# Patient Record
Sex: FEMALE | Race: WHITE | NOT HISPANIC OR LATINO
[De-identification: names, ages, dates, MRNs, and addresses within clinical notes are randomized per-mention and may not be internally consistent; named-entity substitution may affect disease eponyms.]

---

## 2019-06-12 ENCOUNTER — TRANSCRIPTION ENCOUNTER (OUTPATIENT)
Age: 34
End: 2019-06-12

## 2019-06-12 ENCOUNTER — OUTPATIENT (OUTPATIENT)
Dept: OUTPATIENT SERVICES | Facility: HOSPITAL | Age: 34
LOS: 1 days | End: 2019-06-12
Payer: COMMERCIAL

## 2019-06-12 VITALS
HEIGHT: 64 IN | TEMPERATURE: 98 F | OXYGEN SATURATION: 98 % | DIASTOLIC BLOOD PRESSURE: 78 MMHG | HEART RATE: 70 BPM | WEIGHT: 210.1 LBS | SYSTOLIC BLOOD PRESSURE: 101 MMHG | RESPIRATION RATE: 15 BRPM

## 2019-06-12 DIAGNOSIS — Z87.39 PERSONAL HISTORY OF OTHER DISEASES OF THE MUSCULOSKELETAL SYSTEM AND CONNECTIVE TISSUE: Chronic | ICD-10-CM

## 2019-06-12 DIAGNOSIS — O02.1 MISSED ABORTION: ICD-10-CM

## 2019-06-12 DIAGNOSIS — Z98.890 OTHER SPECIFIED POSTPROCEDURAL STATES: Chronic | ICD-10-CM

## 2019-06-12 LAB
BLD GP AB SCN SERPL QL: NEGATIVE — SIGNIFICANT CHANGE UP
RH IG SCN BLD-IMP: POSITIVE — SIGNIFICANT CHANGE UP

## 2019-06-12 PROCEDURE — G0463: CPT

## 2019-06-12 PROCEDURE — 86901 BLOOD TYPING SEROLOGIC RH(D): CPT

## 2019-06-12 PROCEDURE — 86850 RBC ANTIBODY SCREEN: CPT

## 2019-06-12 PROCEDURE — 85027 COMPLETE CBC AUTOMATED: CPT

## 2019-06-12 PROCEDURE — 86900 BLOOD TYPING SEROLOGIC ABO: CPT

## 2019-06-12 RX ORDER — SODIUM CHLORIDE 9 MG/ML
3 INJECTION INTRAMUSCULAR; INTRAVENOUS; SUBCUTANEOUS EVERY 8 HOURS
Refills: 0 | Status: DISCONTINUED | OUTPATIENT
Start: 2019-06-13 | End: 2019-06-28

## 2019-06-12 NOTE — H&P PST ADULT - NSANTHOSAYNRD_GEN_A_CORE
No. FISH screening performed.  STOP BANG Legend: 0-2 = LOW Risk; 3-4 = INTERMEDIATE Risk; 5-8 = HIGH Risk

## 2019-06-12 NOTE — H&P PST ADULT - ATTENDING COMMENTS
Pt seen and evaluated.  Here for Dilation and vacuum curettage for missed . Risks benefits and alternatives reviewed.  All questions answered.  Pt wishes to proceed.

## 2019-06-12 NOTE — H&P PST ADULT - HISTORY OF PRESENT ILLNESS
33 year old female  reports having no heart beat @ 9 weeks of pregnancy, scheduled for suction tomorrow 19.

## 2019-06-12 NOTE — H&P PST ADULT - NSICDXPASTMEDICALHX_GEN_ALL_CORE_FT
PAST MEDICAL HISTORY:  Broviac site infection, sequela infection stomach to groin, - for right great big toe osteomyelitis    Missed  9 weeks of pregnancy

## 2019-06-12 NOTE — H&P PST ADULT - NSICDXPASTSURGICALHX_GEN_ALL_CORE_FT
PAST SURGICAL HISTORY:  H/O osteomyelitis right big toe  infection stomach to groin, 1998-99 for right great big toe osteomyelitis    History of augmentation of left breast 2005  & bilateral revision 2010

## 2019-06-13 ENCOUNTER — RESULT REVIEW (OUTPATIENT)
Age: 34
End: 2019-06-13

## 2019-06-13 ENCOUNTER — OUTPATIENT (OUTPATIENT)
Dept: OUTPATIENT SERVICES | Facility: HOSPITAL | Age: 34
LOS: 1 days | End: 2019-06-13
Payer: COMMERCIAL

## 2019-06-13 VITALS
DIASTOLIC BLOOD PRESSURE: 67 MMHG | SYSTOLIC BLOOD PRESSURE: 108 MMHG | RESPIRATION RATE: 18 BRPM | OXYGEN SATURATION: 100 % | WEIGHT: 210.1 LBS | HEIGHT: 64 IN | HEART RATE: 67 BPM | TEMPERATURE: 97 F

## 2019-06-13 VITALS
HEART RATE: 63 BPM | DIASTOLIC BLOOD PRESSURE: 62 MMHG | TEMPERATURE: 99 F | OXYGEN SATURATION: 100 % | RESPIRATION RATE: 18 BRPM | SYSTOLIC BLOOD PRESSURE: 110 MMHG

## 2019-06-13 DIAGNOSIS — Z87.39 PERSONAL HISTORY OF OTHER DISEASES OF THE MUSCULOSKELETAL SYSTEM AND CONNECTIVE TISSUE: Chronic | ICD-10-CM

## 2019-06-13 DIAGNOSIS — O02.1 MISSED ABORTION: ICD-10-CM

## 2019-06-13 DIAGNOSIS — Z98.890 OTHER SPECIFIED POSTPROCEDURAL STATES: Chronic | ICD-10-CM

## 2019-06-13 LAB
HCT VFR BLD CALC: 37 % — SIGNIFICANT CHANGE UP (ref 34.5–45)
HGB BLD-MCNC: 12.7 G/DL — SIGNIFICANT CHANGE UP (ref 11.5–15.5)
MCHC RBC-ENTMCNC: 31.3 PG — SIGNIFICANT CHANGE UP (ref 27–34)
MCHC RBC-ENTMCNC: 34.3 GM/DL — SIGNIFICANT CHANGE UP (ref 32–36)
MCV RBC AUTO: 91.1 FL — SIGNIFICANT CHANGE UP (ref 80–100)
PLATELET # BLD AUTO: 270 K/UL — SIGNIFICANT CHANGE UP (ref 150–400)
RBC # BLD: 4.06 M/UL — SIGNIFICANT CHANGE UP (ref 3.8–5.2)
RBC # FLD: 11.7 % — SIGNIFICANT CHANGE UP (ref 10.3–14.5)
WBC # BLD: 9.2 K/UL — SIGNIFICANT CHANGE UP (ref 3.8–10.5)
WBC # FLD AUTO: 9.2 K/UL — SIGNIFICANT CHANGE UP (ref 3.8–10.5)

## 2019-06-13 PROCEDURE — 59820 CARE OF MISCARRIAGE: CPT

## 2019-06-13 PROCEDURE — 88305 TISSUE EXAM BY PATHOLOGIST: CPT

## 2019-06-13 PROCEDURE — 88305 TISSUE EXAM BY PATHOLOGIST: CPT | Mod: 26

## 2019-06-13 RX ORDER — CELECOXIB 200 MG/1
200 CAPSULE ORAL ONCE
Refills: 0 | Status: COMPLETED | OUTPATIENT
Start: 2019-06-13 | End: 2019-06-13

## 2019-06-13 RX ORDER — ACETAMINOPHEN 500 MG
1000 TABLET ORAL ONCE
Refills: 0 | Status: COMPLETED | OUTPATIENT
Start: 2019-06-13 | End: 2019-06-13

## 2019-06-13 RX ORDER — SODIUM CHLORIDE 9 MG/ML
1000 INJECTION, SOLUTION INTRAVENOUS
Refills: 0 | Status: DISCONTINUED | OUTPATIENT
Start: 2019-06-13 | End: 2019-06-28

## 2019-06-13 RX ADMIN — Medication 1000 MILLIGRAM(S): at 10:33

## 2019-06-13 RX ADMIN — CELECOXIB 200 MILLIGRAM(S): 200 CAPSULE ORAL at 10:33

## 2019-06-13 NOTE — ASU PATIENT PROFILE, ADULT - PMH
Broviac site infection, sequela  infection stomach to groin,  for right great big toe osteomyelitis  Missed   9 weeks of pregnancy

## 2019-06-13 NOTE — ASU DISCHARGE PLAN (ADULT/PEDIATRIC) - NURSING INSTRUCTIONS
call if not void in 8 hours , for excessive bleeding, pain ,swelling or fever greater than 101. Pelvic rest for 2 weeks

## 2019-06-13 NOTE — BRIEF OPERATIVE NOTE - NSICDXBRIEFPROCEDURE_GEN_ALL_CORE_FT
PROCEDURES:  Dilation and curettage, uterus, using suction, for missed first trimester  2019 12:27:08  Sneha Zuñiga

## 2019-06-13 NOTE — ASU PATIENT PROFILE, ADULT - PSH
H/O osteomyelitis  right big toe  infection stomach to groin, 1998-99 for right great big toe osteomyelitis  History of augmentation of left breast  2005  & bilateral revision 2010

## 2019-06-13 NOTE — ASU DISCHARGE PLAN (ADULT/PEDIATRIC) - CARE PROVIDER_API CALL
Sneha Zuñiga (MD)  Obstetrics  Gynecology  [address] [place] [id number]  Phone: [phone]  Fax: [phone]  Follow Up Time:

## 2020-02-03 ENCOUNTER — RESULT REVIEW (OUTPATIENT)
Age: 35
End: 2020-02-03

## 2020-04-18 PROBLEM — O02.1 MISSED ABORTION: Chronic | Status: ACTIVE | Noted: 2019-06-12

## 2020-04-18 PROBLEM — T80.219S: Chronic | Status: ACTIVE | Noted: 2019-06-12

## 2020-04-27 ENCOUNTER — APPOINTMENT (OUTPATIENT)
Dept: ANTEPARTUM | Facility: CLINIC | Age: 35
End: 2020-04-27
Payer: COMMERCIAL

## 2020-04-27 ENCOUNTER — ASOB RESULT (OUTPATIENT)
Age: 35
End: 2020-04-27

## 2020-04-27 PROCEDURE — 76811 OB US DETAILED SNGL FETUS: CPT

## 2020-08-27 ENCOUNTER — APPOINTMENT (OUTPATIENT)
Dept: DISASTER EMERGENCY | Facility: CLINIC | Age: 35
End: 2020-08-27

## 2020-08-28 LAB — SARS-COV-2 N GENE NPH QL NAA+PROBE: NOT DETECTED

## 2020-08-29 ENCOUNTER — TRANSCRIPTION ENCOUNTER (OUTPATIENT)
Age: 35
End: 2020-08-29

## 2020-08-30 ENCOUNTER — OUTPATIENT (OUTPATIENT)
Dept: OUTPATIENT SERVICES | Facility: HOSPITAL | Age: 35
LOS: 1 days | End: 2020-08-30
Payer: COMMERCIAL

## 2020-08-30 ENCOUNTER — INPATIENT (INPATIENT)
Facility: HOSPITAL | Age: 35
LOS: 1 days | Discharge: HOME | End: 2020-09-01
Attending: OBSTETRICS & GYNECOLOGY | Admitting: OBSTETRICS & GYNECOLOGY
Payer: COMMERCIAL

## 2020-08-30 VITALS — HEIGHT: 65 IN | WEIGHT: 257.94 LBS

## 2020-08-30 DIAGNOSIS — Z87.39 PERSONAL HISTORY OF OTHER DISEASES OF THE MUSCULOSKELETAL SYSTEM AND CONNECTIVE TISSUE: Chronic | ICD-10-CM

## 2020-08-30 DIAGNOSIS — O26.899 OTHER SPECIFIED PREGNANCY RELATED CONDITIONS, UNSPECIFIED TRIMESTER: ICD-10-CM

## 2020-08-30 DIAGNOSIS — Z98.890 OTHER SPECIFIED POSTPROCEDURAL STATES: Chronic | ICD-10-CM

## 2020-08-30 DIAGNOSIS — Z3A.00 WEEKS OF GESTATION OF PREGNANCY NOT SPECIFIED: ICD-10-CM

## 2020-08-30 DIAGNOSIS — Z33.1 PREGNANT STATE, INCIDENTAL: ICD-10-CM

## 2020-08-30 LAB
BASOPHILS # BLD AUTO: 0.07 K/UL — SIGNIFICANT CHANGE UP (ref 0–0.2)
BASOPHILS NFR BLD AUTO: 0.6 % — SIGNIFICANT CHANGE UP (ref 0–2)
BLD GP AB SCN SERPL QL: NEGATIVE — SIGNIFICANT CHANGE UP
EOSINOPHIL # BLD AUTO: 0.09 K/UL — SIGNIFICANT CHANGE UP (ref 0–0.5)
EOSINOPHIL NFR BLD AUTO: 0.8 % — SIGNIFICANT CHANGE UP (ref 0–6)
HCT VFR BLD CALC: 33.6 % — LOW (ref 34.5–45)
HGB BLD-MCNC: 11.6 G/DL — SIGNIFICANT CHANGE UP (ref 11.5–15.5)
IMM GRANULOCYTES NFR BLD AUTO: 0.8 % — SIGNIFICANT CHANGE UP (ref 0–1.5)
LYMPHOCYTES # BLD AUTO: 17.3 % — SIGNIFICANT CHANGE UP (ref 13–44)
LYMPHOCYTES # BLD AUTO: 2 K/UL — SIGNIFICANT CHANGE UP (ref 1–3.3)
MCHC RBC-ENTMCNC: 31.9 PG — SIGNIFICANT CHANGE UP (ref 27–34)
MCHC RBC-ENTMCNC: 34.5 GM/DL — SIGNIFICANT CHANGE UP (ref 32–36)
MCV RBC AUTO: 92.3 FL — SIGNIFICANT CHANGE UP (ref 80–100)
MONOCYTES # BLD AUTO: 0.86 K/UL — SIGNIFICANT CHANGE UP (ref 0–0.9)
MONOCYTES NFR BLD AUTO: 7.4 % — SIGNIFICANT CHANGE UP (ref 2–14)
NEUTROPHILS # BLD AUTO: 8.47 K/UL — HIGH (ref 1.8–7.4)
NEUTROPHILS NFR BLD AUTO: 73.1 % — SIGNIFICANT CHANGE UP (ref 43–77)
NRBC # BLD: 0 /100 WBCS — SIGNIFICANT CHANGE UP (ref 0–0)
PLATELET # BLD AUTO: 186 K/UL — SIGNIFICANT CHANGE UP (ref 150–400)
RBC # BLD: 3.64 M/UL — LOW (ref 3.8–5.2)
RBC # FLD: 12.9 % — SIGNIFICANT CHANGE UP (ref 10.3–14.5)
RH IG SCN BLD-IMP: POSITIVE — SIGNIFICANT CHANGE UP
SARS-COV-2 IGG SERPL QL IA: NEGATIVE — SIGNIFICANT CHANGE UP
SARS-COV-2 IGM SERPL IA-ACNC: 0.08 INDEX — SIGNIFICANT CHANGE UP
T PALLIDUM AB TITR SER: NEGATIVE — SIGNIFICANT CHANGE UP
WBC # BLD: 11.58 K/UL — HIGH (ref 3.8–10.5)
WBC # FLD AUTO: 11.58 K/UL — HIGH (ref 3.8–10.5)

## 2020-08-30 PROCEDURE — G0463: CPT

## 2020-08-30 RX ORDER — CITRIC ACID/SODIUM CITRATE 300-500 MG
15 SOLUTION, ORAL ORAL ONCE
Refills: 0 | Status: COMPLETED | OUTPATIENT
Start: 2020-08-30 | End: 2020-08-30

## 2020-08-30 RX ORDER — OXYCODONE HYDROCHLORIDE 5 MG/1
5 TABLET ORAL
Refills: 0 | Status: DISCONTINUED | OUTPATIENT
Start: 2020-08-30 | End: 2020-08-31

## 2020-08-30 RX ORDER — SODIUM CHLORIDE 9 MG/ML
1000 INJECTION, SOLUTION INTRAVENOUS
Refills: 0 | Status: DISCONTINUED | OUTPATIENT
Start: 2020-08-30 | End: 2020-08-30

## 2020-08-30 RX ORDER — SIMETHICONE 80 MG/1
80 TABLET, CHEWABLE ORAL EVERY 4 HOURS
Refills: 0 | Status: DISCONTINUED | OUTPATIENT
Start: 2020-08-30 | End: 2020-09-01

## 2020-08-30 RX ORDER — SODIUM CHLORIDE 9 MG/ML
1000 INJECTION, SOLUTION INTRAVENOUS
Refills: 0 | Status: DISCONTINUED | OUTPATIENT
Start: 2020-08-30 | End: 2020-09-01

## 2020-08-30 RX ORDER — IBUPROFEN 200 MG
600 TABLET ORAL EVERY 6 HOURS
Refills: 0 | Status: COMPLETED | OUTPATIENT
Start: 2020-08-30 | End: 2021-07-29

## 2020-08-30 RX ORDER — TETANUS TOXOID, REDUCED DIPHTHERIA TOXOID AND ACELLULAR PERTUSSIS VACCINE, ADSORBED 5; 2.5; 8; 8; 2.5 [IU]/.5ML; [IU]/.5ML; UG/.5ML; UG/.5ML; UG/.5ML
0.5 SUSPENSION INTRAMUSCULAR ONCE
Refills: 0 | Status: DISCONTINUED | OUTPATIENT
Start: 2020-08-30 | End: 2020-09-01

## 2020-08-30 RX ORDER — NALOXONE HYDROCHLORIDE 4 MG/.1ML
0.1 SPRAY NASAL
Refills: 0 | Status: DISCONTINUED | OUTPATIENT
Start: 2020-08-30 | End: 2020-08-31

## 2020-08-30 RX ORDER — OXYCODONE HYDROCHLORIDE 5 MG/1
5 TABLET ORAL
Refills: 0 | Status: COMPLETED | OUTPATIENT
Start: 2020-08-30 | End: 2020-09-06

## 2020-08-30 RX ORDER — OXYCODONE HYDROCHLORIDE 5 MG/1
10 TABLET ORAL
Refills: 0 | Status: DISCONTINUED | OUTPATIENT
Start: 2020-08-30 | End: 2020-08-31

## 2020-08-30 RX ORDER — HEPARIN SODIUM 5000 [USP'U]/ML
5000 INJECTION INTRAVENOUS; SUBCUTANEOUS EVERY 12 HOURS
Refills: 0 | Status: DISCONTINUED | OUTPATIENT
Start: 2020-08-30 | End: 2020-09-01

## 2020-08-30 RX ORDER — MORPHINE SULFATE 50 MG/1
0.15 CAPSULE, EXTENDED RELEASE ORAL ONCE
Refills: 0 | Status: DISCONTINUED | OUTPATIENT
Start: 2020-08-30 | End: 2020-08-31

## 2020-08-30 RX ORDER — OXYTOCIN 10 UNIT/ML
333.33 VIAL (ML) INJECTION
Qty: 20 | Refills: 0 | Status: DISCONTINUED | OUTPATIENT
Start: 2020-08-30 | End: 2020-09-01

## 2020-08-30 RX ORDER — DIPHENHYDRAMINE HCL 50 MG
25 CAPSULE ORAL EVERY 6 HOURS
Refills: 0 | Status: DISCONTINUED | OUTPATIENT
Start: 2020-08-30 | End: 2020-09-01

## 2020-08-30 RX ORDER — ACETAMINOPHEN 500 MG
1000 TABLET ORAL ONCE
Refills: 0 | Status: COMPLETED | OUTPATIENT
Start: 2020-08-30 | End: 2020-08-30

## 2020-08-30 RX ORDER — OXYCODONE HYDROCHLORIDE 5 MG/1
5 TABLET ORAL ONCE
Refills: 0 | Status: DISCONTINUED | OUTPATIENT
Start: 2020-08-30 | End: 2020-09-01

## 2020-08-30 RX ORDER — KETOROLAC TROMETHAMINE 30 MG/ML
30 SYRINGE (ML) INJECTION EVERY 6 HOURS
Refills: 0 | Status: DISCONTINUED | OUTPATIENT
Start: 2020-08-30 | End: 2020-08-31

## 2020-08-30 RX ORDER — FAMOTIDINE 10 MG/ML
20 INJECTION INTRAVENOUS ONCE
Refills: 0 | Status: COMPLETED | OUTPATIENT
Start: 2020-08-30 | End: 2020-08-30

## 2020-08-30 RX ORDER — CITRIC ACID/SODIUM CITRATE 300-500 MG
30 SOLUTION, ORAL ORAL ONCE
Refills: 0 | Status: DISCONTINUED | OUTPATIENT
Start: 2020-08-30 | End: 2020-08-30

## 2020-08-30 RX ORDER — LANOLIN
1 OINTMENT (GRAM) TOPICAL EVERY 6 HOURS
Refills: 0 | Status: DISCONTINUED | OUTPATIENT
Start: 2020-08-30 | End: 2020-09-01

## 2020-08-30 RX ORDER — ONDANSETRON 8 MG/1
4 TABLET, FILM COATED ORAL EVERY 6 HOURS
Refills: 0 | Status: DISCONTINUED | OUTPATIENT
Start: 2020-08-30 | End: 2020-08-31

## 2020-08-30 RX ORDER — ACETAMINOPHEN 500 MG
975 TABLET ORAL
Refills: 0 | Status: DISCONTINUED | OUTPATIENT
Start: 2020-08-30 | End: 2020-09-01

## 2020-08-30 RX ORDER — SODIUM CHLORIDE 9 MG/ML
1000 INJECTION, SOLUTION INTRAVENOUS ONCE
Refills: 0 | Status: DISCONTINUED | OUTPATIENT
Start: 2020-08-30 | End: 2020-08-30

## 2020-08-30 RX ORDER — MAGNESIUM HYDROXIDE 400 MG/1
30 TABLET, CHEWABLE ORAL
Refills: 0 | Status: DISCONTINUED | OUTPATIENT
Start: 2020-08-30 | End: 2020-09-01

## 2020-08-30 RX ORDER — METOCLOPRAMIDE HCL 10 MG
10 TABLET ORAL ONCE
Refills: 0 | Status: DISCONTINUED | OUTPATIENT
Start: 2020-08-30 | End: 2020-08-30

## 2020-08-30 RX ORDER — DEXAMETHASONE 0.5 MG/5ML
4 ELIXIR ORAL EVERY 6 HOURS
Refills: 0 | Status: DISCONTINUED | OUTPATIENT
Start: 2020-08-30 | End: 2020-08-31

## 2020-08-30 RX ADMIN — FAMOTIDINE 20 MILLIGRAM(S): 10 INJECTION INTRAVENOUS at 07:45

## 2020-08-30 RX ADMIN — Medication 30 MILLIGRAM(S): at 16:15

## 2020-08-30 RX ADMIN — Medication 30 MILLIGRAM(S): at 21:44

## 2020-08-30 RX ADMIN — Medication 975 MILLIGRAM(S): at 18:45

## 2020-08-30 RX ADMIN — Medication 400 MILLIGRAM(S): at 12:25

## 2020-08-30 RX ADMIN — Medication 30 MILLIGRAM(S): at 15:43

## 2020-08-30 RX ADMIN — Medication 15 MILLILITER(S): at 07:45

## 2020-08-30 RX ADMIN — HEPARIN SODIUM 5000 UNIT(S): 5000 INJECTION INTRAVENOUS; SUBCUTANEOUS at 19:23

## 2020-08-30 RX ADMIN — Medication 30 MILLIGRAM(S): at 20:44

## 2020-08-30 RX ADMIN — Medication 975 MILLIGRAM(S): at 19:15

## 2020-08-30 NOTE — PRE-ANESTHESIA EVALUATION ADULT - NSATTENDATTESTRD_GEN_ALL_CORE
[Postmenopausal] : history of menopause having occurred
The patient has been re-examined and I agree with the above assessment or I updated with my findings.

## 2020-08-31 LAB
BASOPHILS # BLD AUTO: 0.07 K/UL — SIGNIFICANT CHANGE UP (ref 0–0.2)
BASOPHILS NFR BLD AUTO: 0.5 % — SIGNIFICANT CHANGE UP (ref 0–2)
EOSINOPHIL # BLD AUTO: 0.08 K/UL — SIGNIFICANT CHANGE UP (ref 0–0.5)
EOSINOPHIL NFR BLD AUTO: 0.6 % — SIGNIFICANT CHANGE UP (ref 0–6)
HCT VFR BLD CALC: 31.1 % — LOW (ref 34.5–45)
HGB BLD-MCNC: 10.8 G/DL — LOW (ref 11.5–15.5)
IMM GRANULOCYTES NFR BLD AUTO: 0.5 % — SIGNIFICANT CHANGE UP (ref 0–1.5)
LYMPHOCYTES # BLD AUTO: 1.34 K/UL — SIGNIFICANT CHANGE UP (ref 1–3.3)
LYMPHOCYTES # BLD AUTO: 10.1 % — LOW (ref 13–44)
MCHC RBC-ENTMCNC: 32 PG — SIGNIFICANT CHANGE UP (ref 27–34)
MCHC RBC-ENTMCNC: 34.7 GM/DL — SIGNIFICANT CHANGE UP (ref 32–36)
MCV RBC AUTO: 92.3 FL — SIGNIFICANT CHANGE UP (ref 80–100)
MONOCYTES # BLD AUTO: 0.99 K/UL — HIGH (ref 0–0.9)
MONOCYTES NFR BLD AUTO: 7.5 % — SIGNIFICANT CHANGE UP (ref 2–14)
NEUTROPHILS # BLD AUTO: 10.73 K/UL — HIGH (ref 1.8–7.4)
NEUTROPHILS NFR BLD AUTO: 80.8 % — HIGH (ref 43–77)
NRBC # BLD: 0 /100 WBCS — SIGNIFICANT CHANGE UP (ref 0–0)
PLATELET # BLD AUTO: 187 K/UL — SIGNIFICANT CHANGE UP (ref 150–400)
RBC # BLD: 3.37 M/UL — LOW (ref 3.8–5.2)
RBC # FLD: 12.9 % — SIGNIFICANT CHANGE UP (ref 10.3–14.5)
WBC # BLD: 13.28 K/UL — HIGH (ref 3.8–10.5)
WBC # FLD AUTO: 13.28 K/UL — HIGH (ref 3.8–10.5)

## 2020-08-31 RX ORDER — IBUPROFEN 200 MG
600 TABLET ORAL EVERY 6 HOURS
Refills: 0 | Status: DISCONTINUED | OUTPATIENT
Start: 2020-08-31 | End: 2020-09-01

## 2020-08-31 RX ORDER — OXYCODONE HYDROCHLORIDE 5 MG/1
5 TABLET ORAL
Refills: 0 | Status: DISCONTINUED | OUTPATIENT
Start: 2020-08-31 | End: 2020-09-01

## 2020-08-31 RX ADMIN — Medication 600 MILLIGRAM(S): at 08:56

## 2020-08-31 RX ADMIN — SIMETHICONE 80 MILLIGRAM(S): 80 TABLET, CHEWABLE ORAL at 15:09

## 2020-08-31 RX ADMIN — Medication 600 MILLIGRAM(S): at 21:42

## 2020-08-31 RX ADMIN — SIMETHICONE 80 MILLIGRAM(S): 80 TABLET, CHEWABLE ORAL at 08:56

## 2020-08-31 RX ADMIN — HEPARIN SODIUM 5000 UNIT(S): 5000 INJECTION INTRAVENOUS; SUBCUTANEOUS at 05:48

## 2020-08-31 RX ADMIN — HEPARIN SODIUM 5000 UNIT(S): 5000 INJECTION INTRAVENOUS; SUBCUTANEOUS at 18:39

## 2020-08-31 RX ADMIN — Medication 600 MILLIGRAM(S): at 15:08

## 2020-08-31 RX ADMIN — Medication 975 MILLIGRAM(S): at 18:38

## 2020-08-31 RX ADMIN — Medication 975 MILLIGRAM(S): at 05:48

## 2020-08-31 RX ADMIN — SIMETHICONE 80 MILLIGRAM(S): 80 TABLET, CHEWABLE ORAL at 18:39

## 2020-08-31 RX ADMIN — Medication 600 MILLIGRAM(S): at 09:30

## 2020-08-31 RX ADMIN — OXYCODONE HYDROCHLORIDE 5 MILLIGRAM(S): 5 TABLET ORAL at 17:30

## 2020-08-31 RX ADMIN — Medication 975 MILLIGRAM(S): at 01:00

## 2020-08-31 RX ADMIN — Medication 30 MILLIGRAM(S): at 03:00

## 2020-08-31 RX ADMIN — Medication 600 MILLIGRAM(S): at 21:12

## 2020-08-31 RX ADMIN — Medication 30 MILLIGRAM(S): at 03:30

## 2020-08-31 RX ADMIN — Medication 975 MILLIGRAM(S): at 00:14

## 2020-08-31 RX ADMIN — Medication 975 MILLIGRAM(S): at 06:28

## 2020-08-31 RX ADMIN — Medication 975 MILLIGRAM(S): at 12:45

## 2020-08-31 RX ADMIN — OXYCODONE HYDROCHLORIDE 5 MILLIGRAM(S): 5 TABLET ORAL at 16:58

## 2020-08-31 RX ADMIN — Medication 975 MILLIGRAM(S): at 12:13

## 2020-08-31 NOTE — PROGRESS NOTE ADULT - SUBJECTIVE AND OBJECTIVE BOX
Day 1 of Anesthesia Pain Management Service    SUBJECTIVE: Doing ok  Pain Scale Score:          [X] Refer to charted pain scores    THERAPY: s/p 150 mcg PF morphine on 8/30/2020      MEDICATIONS  (STANDING):  acetaminophen   Tablet .. 975 milliGRAM(s) Oral <User Schedule>  diphtheria/tetanus/pertussis (acellular) Vaccine (ADAcel) 0.5 milliLiter(s) IntraMuscular once  heparin   Injectable 5000 Unit(s) SubCutaneous every 12 hours  ibuprofen  Tablet. 600 milliGRAM(s) Oral every 6 hours  lactated ringers. 1000 milliLiter(s) (125 mL/Hr) IV Continuous <Continuous>  morphine PF Spinal 0.15 milliGRAM(s) IntraThecal. once  oxytocin Infusion 333.333 milliUNIT(s)/Min (1000 mL/Hr) IV Continuous <Continuous>  oxytocin Infusion 333.333 milliUNIT(s)/Min (1000 mL/Hr) IV Continuous <Continuous>    MEDICATIONS  (PRN):  dexAMETHasone  Injectable 4 milliGRAM(s) IV Push every 6 hours PRN Nausea  diphenhydrAMINE 25 milliGRAM(s) Oral every 6 hours PRN Itching  lanolin Ointment 1 Application(s) Topical every 6 hours PRN Sore Nipples  magnesium hydroxide Suspension 30 milliLiter(s) Oral two times a day PRN Constipation  naloxone Injectable 0.1 milliGRAM(s) IV Push every 3 minutes PRN For ANY of the following changes in patient status:  A. Breaths Per Minute LESS THAN 10, B. Oxygen saturation LESS THAN 90%, C. Sedation score of 6 for Stop After: 4 Times  ondansetron Injectable 4 milliGRAM(s) IV Push every 6 hours PRN Nausea  oxyCODONE    IR 5 milliGRAM(s) Oral every 3 hours PRN Moderate to Severe Pain (4-10)  oxyCODONE    IR 5 milliGRAM(s) Oral once PRN Moderate to Severe Pain (4-10)  oxyCODONE    IR 5 milliGRAM(s) Oral every 3 hours PRN Mild Pain (1 - 3)  oxyCODONE    IR 10 milliGRAM(s) Oral every 3 hours PRN Moderate Pain (4 - 6)  simethicone 80 milliGRAM(s) Chew every 4 hours PRN Gas      OBJECTIVE:    Sedation:        	[X] Alert	 [ ] Drowsy	[ ] Arousable      [ ] Asleep       [ ] Unresponsive    Side Effects:	[X] None 	[ ] Nausea	[ ] Vomiting         [ ] Pruritus  		[ ] Weakness            [ ] Numbness	          [ ] Other:    Vital Signs Last 24 Hrs  T(C): 36.8 (31 Aug 2020 05:36), Max: 37.2 (30 Aug 2020 12:00)  T(F): 98.3 (31 Aug 2020 05:36), Max: 99 (30 Aug 2020 12:00)  HR: 97 (31 Aug 2020 05:36) (66 - 97)  BP: 93/62 (31 Aug 2020 05:36) (93/62 - 145/60)  BP(mean): 78 (30 Aug 2020 12:00) (70 - 87)  RR: 18 (31 Aug 2020 05:36) (18 - 22)  SpO2: 98% (30 Aug 2020 21:15) (95% - 99%)    ASSESSMENT/ PLAN  [X] Patient transitioned to prn analgesics  [X] Pain management per primary service, pain service to sign off   [X]Documentation and Verification of current medications

## 2020-08-31 NOTE — PROGRESS NOTE ADULT - PROBLEM SELECTOR PLAN 1
- Continue with po analgesia  - Increase ambulation  - Continue regular diet  - d/c IV fluids  - Check CBC    Andrew Najera,PGY1

## 2020-08-31 NOTE — PROGRESS NOTE ADULT - SUBJECTIVE AND OBJECTIVE BOX
Patient seen and examined at bedside, no acute overnight events. No acute complaints, pain well controlled. Patient is ambulating and tolerating regular diet. Has not yet passed flatus. Denies CP, SOB, N/V, HA, blurred vision, epigastric pain.    Vital Signs Last 24 Hours  T(C): 36.8 (08-31-20 @ 05:36), Max: 37.2 (08-30-20 @ 12:00)  HR: 97 (08-31-20 @ 05:36) (66 - 97)  BP: 93/62 (08-31-20 @ 05:36) (93/62 - 145/60)  RR: 18 (08-31-20 @ 05:36) (18 - 22)  SpO2: 98% (08-30-20 @ 21:15) (95% - 99%)    I&O's Summary    30 Aug 2020 07:01  -  31 Aug 2020 06:29  --------------------------------------------------------  IN: 2000 mL / OUT: 4193 mL / NET: -2193 mL        Physical Exam:  General: NAD  Abdomen: Soft, expected tenderness, non-distended, fundus firm  Incision: Pfannenstiel incision CDI, dermabond over incision  Pelvic: Lochia wnl    Labs:    Blood Type: O Positive  Antibody Screen: Negative  RPR: Negative               11.6   11.58 )-----------( 186      ( 08-30 @ 06:13 )             33.6         MEDICATIONS  (STANDING):  acetaminophen   Tablet .. 975 milliGRAM(s) Oral <User Schedule>  diphtheria/tetanus/pertussis (acellular) Vaccine (ADAcel) 0.5 milliLiter(s) IntraMuscular once  heparin   Injectable 5000 Unit(s) SubCutaneous every 12 hours  ibuprofen  Tablet. 600 milliGRAM(s) Oral every 6 hours  lactated ringers. 1000 milliLiter(s) (125 mL/Hr) IV Continuous <Continuous>  morphine PF Spinal 0.15 milliGRAM(s) IntraThecal. once  oxytocin Infusion 333.333 milliUNIT(s)/Min (1000 mL/Hr) IV Continuous <Continuous>  oxytocin Infusion 333.333 milliUNIT(s)/Min (1000 mL/Hr) IV Continuous <Continuous>    MEDICATIONS  (PRN):  dexAMETHasone  Injectable 4 milliGRAM(s) IV Push every 6 hours PRN Nausea  diphenhydrAMINE 25 milliGRAM(s) Oral every 6 hours PRN Itching  lanolin Ointment 1 Application(s) Topical every 6 hours PRN Sore Nipples  magnesium hydroxide Suspension 30 milliLiter(s) Oral two times a day PRN Constipation  naloxone Injectable 0.1 milliGRAM(s) IV Push every 3 minutes PRN For ANY of the following changes in patient status:  A. Breaths Per Minute LESS THAN 10, B. Oxygen saturation LESS THAN 90%, C. Sedation score of 6 for Stop After: 4 Times  ondansetron Injectable 4 milliGRAM(s) IV Push every 6 hours PRN Nausea  oxyCODONE    IR 5 milliGRAM(s) Oral every 3 hours PRN Moderate to Severe Pain (4-10)  oxyCODONE    IR 5 milliGRAM(s) Oral once PRN Moderate to Severe Pain (4-10)  oxyCODONE    IR 5 milliGRAM(s) Oral every 3 hours PRN Mild Pain (1 - 3)  oxyCODONE    IR 10 milliGRAM(s) Oral every 3 hours PRN Moderate Pain (4 - 6)  simethicone 80 milliGRAM(s) Chew every 4 hours PRN Gas

## 2020-09-01 ENCOUNTER — TRANSCRIPTION ENCOUNTER (OUTPATIENT)
Age: 35
End: 2020-09-01

## 2020-09-01 VITALS
HEART RATE: 90 BPM | SYSTOLIC BLOOD PRESSURE: 117 MMHG | DIASTOLIC BLOOD PRESSURE: 74 MMHG | RESPIRATION RATE: 18 BRPM | TEMPERATURE: 98 F | OXYGEN SATURATION: 96 %

## 2020-09-01 PROCEDURE — 85027 COMPLETE CBC AUTOMATED: CPT

## 2020-09-01 PROCEDURE — 59050 FETAL MONITOR W/REPORT: CPT

## 2020-09-01 PROCEDURE — 86900 BLOOD TYPING SEROLOGIC ABO: CPT

## 2020-09-01 PROCEDURE — 86780 TREPONEMA PALLIDUM: CPT

## 2020-09-01 PROCEDURE — 86901 BLOOD TYPING SEROLOGIC RH(D): CPT

## 2020-09-01 PROCEDURE — 86850 RBC ANTIBODY SCREEN: CPT

## 2020-09-01 PROCEDURE — 59025 FETAL NON-STRESS TEST: CPT

## 2020-09-01 PROCEDURE — 86769 SARS-COV-2 COVID-19 ANTIBODY: CPT

## 2020-09-01 RX ORDER — OXYCODONE HYDROCHLORIDE 5 MG/1
1 TABLET ORAL
Qty: 10 | Refills: 0
Start: 2020-09-01

## 2020-09-01 RX ADMIN — Medication 600 MILLIGRAM(S): at 10:20

## 2020-09-01 RX ADMIN — Medication 975 MILLIGRAM(S): at 00:13

## 2020-09-01 RX ADMIN — Medication 975 MILLIGRAM(S): at 06:43

## 2020-09-01 RX ADMIN — Medication 975 MILLIGRAM(S): at 06:13

## 2020-09-01 RX ADMIN — Medication 600 MILLIGRAM(S): at 03:29

## 2020-09-01 RX ADMIN — Medication 975 MILLIGRAM(S): at 00:43

## 2020-09-01 RX ADMIN — Medication 600 MILLIGRAM(S): at 09:48

## 2020-09-01 RX ADMIN — HEPARIN SODIUM 5000 UNIT(S): 5000 INJECTION INTRAVENOUS; SUBCUTANEOUS at 06:13

## 2020-09-01 NOTE — PROGRESS NOTE ADULT - SUBJECTIVE AND OBJECTIVE BOX
Patient seen and examined at bedside, no acute overnight events. No acute complaints, pain well controlled. Patient is ambulating, voiding spontaneously, passing flatus, and tolerating regular diet. Denies CP, SOB, N/V, HA, blurred vision, epigastric pain.    Vital Signs Last 24 Hours  T(C): 36.5 (08-31-20 @ 21:30), Max: 37.2 (08-31-20 @ 13:02)  HR: 87 (08-31-20 @ 21:30) (86 - 97)  BP: 102/66 (08-31-20 @ 21:30) (93/62 - 123/69)  RR: 18 (08-31-20 @ 21:30) (18 - 18)  SpO2: 97% (08-31-20 @ 21:30) (97% - 99%)    Physical Exam:  General: NAD  Abdomen: Soft, expected tenderness, non-distended, fundus firm  Incision: Pfannenstiel incision CDI, dermabond over incision  Pelvic: Lochia wnl    Labs:    Blood Type: O Positive  Antibody Screen: Negative  RPR: Negative               10.8   13.28 )-----------( 187      ( 08-31 @ 10:15 )             31.1                11.6   11.58 )-----------( 186      ( 08-30 @ 06:13 )             33.6         MEDICATIONS  (STANDING):  acetaminophen   Tablet .. 975 milliGRAM(s) Oral <User Schedule>  diphtheria/tetanus/pertussis (acellular) Vaccine (ADAcel) 0.5 milliLiter(s) IntraMuscular once  heparin   Injectable 5000 Unit(s) SubCutaneous every 12 hours  ibuprofen  Tablet. 600 milliGRAM(s) Oral every 6 hours  lactated ringers. 1000 milliLiter(s) (125 mL/Hr) IV Continuous <Continuous>  oxytocin Infusion 333.333 milliUNIT(s)/Min (1000 mL/Hr) IV Continuous <Continuous>  oxytocin Infusion 333.333 milliUNIT(s)/Min (1000 mL/Hr) IV Continuous <Continuous>    MEDICATIONS  (PRN):  diphenhydrAMINE 25 milliGRAM(s) Oral every 6 hours PRN Itching  lanolin Ointment 1 Application(s) Topical every 6 hours PRN Sore Nipples  magnesium hydroxide Suspension 30 milliLiter(s) Oral two times a day PRN Constipation  oxyCODONE    IR 5 milliGRAM(s) Oral once PRN Moderate to Severe Pain (4-10)  oxyCODONE    IR 5 milliGRAM(s) Oral every 3 hours PRN Moderate to Severe Pain (4-10)  simethicone 80 milliGRAM(s) Chew every 4 hours PRN Gas

## 2020-09-01 NOTE — DISCHARGE NOTE OB - CARE PROVIDER_API CALL
Zbigniew Torres  OBSTETRICS AND GYNECOLOGY  [address] [place] [address], [address]  [place] [address]  Phone: [phone]  Fax: [phone]  Follow Up Time:

## 2020-09-01 NOTE — DISCHARGE NOTE OB - PATIENT PORTAL LINK FT
You can access the FollowMyHealth Patient Portal offered by [facility] by registering at the following website: [email or web address]. By joining [facility]’s FollowMyHealth portal, you will also be able to view your health information using other applications (apps) compatible with our system.

## 2020-09-01 NOTE — DISCHARGE NOTE OB - MEDICATION SUMMARY - MEDICATIONS TO TAKE
I will START or STAY ON the medications listed below when I get home from the hospital:    ibuprofen 600 mg oral tablet  -- 1 tab(s) by mouth every 6 hours  -- Indication: For Pain    acetaminophen 500 mg oral tablet  -- 2 tab(s) by mouth every 6 hours  -- Indication: For Pain    oxyCODONE 5 mg oral tablet  -- 1 tab(s) by mouth once, As needed, Moderate to Severe Pain (4-10) MDD:4  -- Indication: For Pain

## 2020-09-01 NOTE — PROGRESS NOTE ADULT - PROBLEM SELECTOR PLAN 1
- Continue with po analgesia  - Increase ambulation  - Continue regular diet  - IV lock  - No labs    Andrew Najera,PGY1

## 2020-09-01 NOTE — DISCHARGE NOTE OB - MATERIALS PROVIDED
Breastfeeding Log/Breastfeeding Guide and Packet/Birth Certificate Instructions/[facility] Hearing Screen Program/[facility]  Screening Program/Breastfeeding Mother’s Support Group Information/Guide to Postpartum Care Breastfeeding Mother’s Support Group Information/Guide to Postpartum Care/Back To Sleep Handout/Birth Certificate Instructions/Shaken Baby Prevention Handout/Breastfeeding Guide and Packet/[facility]  Screening Program/[facility] Hearing Screen Program/Breastfeeding Log
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
## 2022-03-14 ENCOUNTER — NON-APPOINTMENT (OUTPATIENT)
Age: 37
End: 2022-03-14

## 2022-04-01 ENCOUNTER — APPOINTMENT (OUTPATIENT)
Dept: BARIATRICS/WEIGHT MGMT | Facility: CLINIC | Age: 37
End: 2022-04-01
Payer: COMMERCIAL

## 2022-04-01 VITALS — HEIGHT: 65 IN | WEIGHT: 238 LBS | BODY MASS INDEX: 39.65 KG/M2

## 2022-04-01 PROCEDURE — 99205 OFFICE O/P NEW HI 60 MIN: CPT | Mod: 95

## 2022-04-01 RX ORDER — VENLAFAXINE HYDROCHLORIDE 150 MG/1
150 CAPSULE, EXTENDED RELEASE ORAL DAILY
Refills: 0 | Status: ACTIVE | COMMUNITY
Start: 2022-04-01

## 2022-04-01 NOTE — ASSESSMENT
[FreeTextEntry1] : 36 y.o F with class 2 obesity\par \par - start phentermine\par - could consider Rybelsus, unsure if it will be covered but could temporarily try in the future\par - get labs\par - email handouts\par - NP in 2 weeks\par \par Extensive dietary counseling was provided:\par -discussed calorie reduction options: plant-based whole food diet vs. Dash / Mediterranean w/ calorie reduction\par -discussed the usefulness of calorie counting phone applications\par -provided patient with daily estimated calorie requirements and recommended calorie reduction amount\par -three meal components emphasized: large portion vegetables/fruit, smaller portion protein favoring plant-based, smaller portion high fiber carbohydrates\par -properties of macronutrients were reviewed to help the patient understand why a balanced diet is important\par -discussed the avoidance of red and processed meat, sugar sweetened beverages, refined carbohydrates, high fat dairy\par -advised avoidance of snack products and packaged / processed foods\par -counseled about meal timing and portion: large breakfast, medium lunch, small dinner\par -advised to avoid carbohydrates in evening if possible\par -regular water or seltzer throughout the day\par -for stimulus control, advised to keep unhealthy foods out of the house or out of view\par -recommended abstaining entirely from restaurant / fast food / take-out meals\par \par \par Lifestyle recommendations for weight loss were extensively reviewed\par -aerobic exercise reviewed: moderate intensity versus high intensity exercise - an estimate of daily / weekly time requirements was reviewed\par -emphasized that resistance training in addition to aerobic may provide added benefit\par -emphasized that long term weight loss and maintenance depend upon exercise\par -the need for adequate sleep (6+ hours) was reviewed\par \par f/u 4 weeks\par f/u 2 weeks NP

## 2022-04-01 NOTE — REVIEW OF SYSTEMS
[Patient Intake Form Reviewed] : Patient intake form was reviewed [Negative] : Allergic/Immunologic [MED-ROS-Cons-FT] : fatigue [MED-ROS-Endo-FT] : fatigue, incr appetite

## 2022-04-01 NOTE — HISTORY OF PRESENT ILLNESS
[FreeTextEntry1] : Bariatric surgery history:  none\par Obesity co-morbidities: depression\par Comorbidities improved or resolved: none\par Anti-obesity medications: none\par Obesity medication side effects: none\par \par Ms. MALISSA RAMSEY is a 36 year year old female who presents for evaluation and treatment of Class 2 obesity. \par \par Obesity related co-morbidities: depression, some snoring. \par \par Last annual - August. Overall things look good.  "feels tired"\par \par Patient lives - with  and 2 kids - 5 and almost 2 y.o. \par Employment status - [employer]\par \par Weight History:\par Lowest adult weight: 136 (age 25)\par Highest adult weight: 275\par \par Has lost 10-15 pounds over the past year.\par \par Obesity began in after pregnancies.  Weight gain has occurred with: unhealthy eating habits\par \par Past weight loss attempts include: WW, Nutrisystem 2021 lost 10, Optavia lost 40, OTC pills.  These have produced a maximum of 30-40 pounds of weight loss.  \par Anti-obesity medications in the past: metformin\par \par Reasons for desiring weight loss: health, confidence\par Perceived obstacles to losing weight: increased snacks. \par \par Sleep: 7-8 hours 10-6am\par \par Has 3 regular meals a day. \par \par Diet history:\par wakes up at: 6:45am\par B: 7am  black coffee, "worst meal for breakfast" - cookies in AM\par walking work out or weights 45-60 min + walking 2-4 miles but none in 2 weeks\par L: 12-1pm - bagel w tuna fish. Or Mom will pack - leftovers, leaves out starch.  ex. chicken breast and apple. \par D: 5-515pm - most nights Mom cooks - chicken with pork with veg - roasted broccoli and starch - cous cous At 4-5 times a week.  Or dinosaur nuggets, pesto - at home. \par \par snacks - more desserts.\par snacks: ice cream, cookies, processed carbs \par eating after dinner: typically no. sometimes desserts - ice cream - about 2-3 times a week, large.  An hr after dinner.  At parents.   doesn't have it at her own home. \par overeating episodes: \par \par Sodas/fast food/processed foods: +fried\par \par Water intake per day:  4 x 16 club soda bottles, about a gallon per day. \par \par Physical activity:\par Patient enjoys: walking, weight training\par Current physical activity treadmill 2-4 miles a day. Weight training 6 days a week\par \par Habits patient would like to change: unsure\par Level of interest in losing weight: 5/5\par Community support: 5/5\par \par Factors that have helped in the past with losing weight and keeping it off: none\par \par

## 2022-05-02 ENCOUNTER — APPOINTMENT (OUTPATIENT)
Dept: BARIATRICS/WEIGHT MGMT | Facility: CLINIC | Age: 37
End: 2022-05-02
Payer: COMMERCIAL

## 2022-05-02 VITALS — WEIGHT: 242 LBS | BODY MASS INDEX: 40.27 KG/M2

## 2022-05-02 DIAGNOSIS — Z13.228 ENCOUNTER FOR SCREENING FOR OTHER SUSPECTED ENDOCRINE DISORDER: ICD-10-CM

## 2022-05-02 DIAGNOSIS — Z13.29 ENCOUNTER FOR SCREENING FOR OTHER SUSPECTED ENDOCRINE DISORDER: ICD-10-CM

## 2022-05-02 DIAGNOSIS — E66.9 OBESITY, UNSPECIFIED: ICD-10-CM

## 2022-05-02 DIAGNOSIS — Z13.21 ENCOUNTER FOR SCREENING FOR NUTRITIONAL DISORDER: ICD-10-CM

## 2022-05-02 DIAGNOSIS — Z13.0 ENCOUNTER FOR SCREENING FOR OTHER SUSPECTED ENDOCRINE DISORDER: ICD-10-CM

## 2022-05-02 DIAGNOSIS — Z01.818 ENCOUNTER FOR OTHER PREPROCEDURAL EXAMINATION: ICD-10-CM

## 2022-05-02 DIAGNOSIS — Z13.1 ENCOUNTER FOR SCREENING FOR DIABETES MELLITUS: ICD-10-CM

## 2022-05-02 PROCEDURE — 99214 OFFICE O/P EST MOD 30 MIN: CPT | Mod: 95

## 2022-05-02 NOTE — ASSESSMENT
[FreeTextEntry1] : 36 y.o F with class 2 obesity\par \par - continue phentermine 1 tab.  consider 1/2 tab bid, schedule snack - nuts and fruit ~3pm\par - could consider Rybelsus, unsure if it will be covered but could temporarily try in the future\par - email handouts\par - get labs done\par \par Extensive dietary counseling was provided:\par -discussed calorie reduction options: plant-based whole food diet vs. Dash / Mediterranean w/ calorie reduction\par -discussed the usefulness of calorie counting phone applications\par -provided patient with daily estimated calorie requirements and recommended calorie reduction amount\par -three meal components emphasized: large portion vegetables/fruit, smaller portion protein favoring plant-based, smaller portion high fiber carbohydrates\par -properties of macronutrients were reviewed to help the patient understand why a balanced diet is important\par -discussed the avoidance of red and processed meat, sugar sweetened beverages, refined carbohydrates, high fat dairy\par -advised avoidance of snack products and packaged / processed foods\par -counseled about meal timing and portion: large breakfast, medium lunch, small dinner\par -advised to avoid carbohydrates in evening if possible\par -regular water or seltzer throughout the day\par -for stimulus control, advised to keep unhealthy foods out of the house or out of view\par -recommended abstaining entirely from restaurant / fast food / take-out meals\par \par \par Lifestyle recommendations for weight loss were extensively reviewed\par -aerobic exercise reviewed: moderate intensity versus high intensity exercise - an estimate of daily / weekly time requirements was reviewed\par -emphasized that resistance training in addition to aerobic may provide added benefit\par -emphasized that long term weight loss and maintenance depend upon exercise\par -the need for adequate sleep (6+ hours) was reviewed\par \par f/u 4 weeks

## 2022-05-02 NOTE — HISTORY OF PRESENT ILLNESS
[FreeTextEntry1] : Bariatric surgery history:  none\par Obesity co-morbidities: depression\par Comorbidities improved or resolved: none\par Anti-obesity medications: phentermine\par Obesity medication side effects: none\par \par Ms. MALISSA RAMSEY is a 36 year year old female who presents for evaluation and treatment of Class 3 obesity. \par \par Obesity related co-morbidities: depression, some snoring. \par \par Last annual - August. Overall things look good.  "feels tired"\par \par Patient lives - with  and 2 kids - 5 and almost 2 y.o. \par Employment status - [employer]\par \par Weight History:\par Lowest adult weight: 136 (age 25)\par Highest adult weight: 275\par \par interim:\par - started phentermine on Apr 6th - started at 246.  238#.  Now taking 1 tab daily\par - went on vacation last week, incr alcohol consumption - didn't snack between meals, incr walking on the beach\par - no negative side effects, other than mild dry mouth\par - around 3pm, incr hunger - has had chocolate recently "I think I'm PMS'ing"\par - looking forward to walking more, avoiding alcohol and seeing 3-5 lbs weight loss per month. goal 220s in 3-6 months\par \par Has lost 10-15 pounds over the past year.\par \par Obesity began in after pregnancies.  Weight gain has occurred with: unhealthy eating habits\par \par Past weight loss attempts include: WW, Nutrisystem 2021 lost 10, Optavia lost 40, OTC pills.  These have produced a maximum of 30-40 pounds of weight loss.  \par Anti-obesity medications in the past: metformin\par \par Reasons for desiring weight loss: health, confidence\par Perceived obstacles to losing weight: increased snacks. \par \par Sleep: 7-8 hours 10pm -6am\par \par Has 3 regular meals a day. \par \par Diet history:\par wakes up at: 6:45am\par B: 7am  black coffee, "worst meal for breakfast" - cookies in AM\par walking work out or weights 45-60 min + walking 2-4 miles but none in 2 weeks\par L: 1130-12 pm - bagel w tuna fish. Or Mom will pack - leftovers, leaves out starch.  ex. chicken breast and apple. \par D: 5- 515pm - most nights Mom cooks - chicken with pork with veg - roasted broccoli and starch - cous cous At 4-5 times a week.  Or dinosaur nuggets, pesto - at home. \par \par snacks - more desserts.\par snacks: ice cream, cookies, processed carbs \par eating after dinner: typically no. sometimes desserts - ice cream - about 2-3 times a week, large.  An hr after dinner.  At parents.   doesn't have it at her own home. \par overeating episodes: none\par \par Sodas/fast food/processed foods: +fried\par \par Water intake per day:  4 x 16 club soda bottles, about a gallon per day. \par \par Physical activity:\par Patient enjoys: walking, weight training\par Current physical activity treadmill 2-4 miles a day. Weight training 6 days a week\par \par Habits patient would like to change: unsure\par Level of interest in losing weight: 5/5\par Community support: 5/5\par \par Factors that have helped in the past with losing weight and keeping it off: none\par \par

## 2022-05-02 NOTE — REASON FOR VISIT
[Home] : at home, [unfilled] , at the time of the visit. [Medical Office: ([facility])___] : at the medical office located in  [Follow-Up Visit] : a follow-up visit for [Obesity] : obesity

## 2022-05-31 ENCOUNTER — APPOINTMENT (OUTPATIENT)
Dept: BARIATRICS/WEIGHT MGMT | Facility: CLINIC | Age: 37
End: 2022-05-31
Payer: COMMERCIAL

## 2022-05-31 VITALS — BODY MASS INDEX: 40.14 KG/M2 | WEIGHT: 241.2 LBS

## 2022-05-31 PROCEDURE — 99214 OFFICE O/P EST MOD 30 MIN: CPT | Mod: 95

## 2022-05-31 NOTE — ASSESSMENT
[FreeTextEntry1] : 36 y.o F with class 2 obesity\par \par - will try another month - phentermine 1 tab. restart after a week. \par - continue phentermine 1 tab.  consider 1/2 tab bid, schedule snack - nuts and fruit ~3pm\par - will discuss w  ordering Ozempic from outside\par - does not feel any decr of appetite nowadays, was helping initially\par - get labs done!!\par - will talk to Dr. olvera, number given\par - f/u NP in 2 weeks\par \par f/u 4 weeks

## 2022-05-31 NOTE — HISTORY OF PRESENT ILLNESS
[FreeTextEntry1] : Bariatric surgery history:  none\par Obesity co-morbidities: depression\par Comorbidities improved or resolved: none\par Anti-obesity medications: phentermine\par Obesity medication side effects: none\par \par Ms. MALISSA RAMSEY is a 36 year year old female who presents for evaluation and treatment of Class 3 obesity. \par \par Obesity related co-morbidities: depression, some snoring. \par \par Last annual - August. Overall things look good.  "feels tired"\par \par Patient lives - with  and 2 kids - 5 and almost 2 y.o. \par Employment status - [employer]\par \par Weight History:\par Lowest adult weight: 136 (age 25)\par Highest adult weight: 275\par \par interim:\par - started phentermine on Apr 6th - started at 246.  Now taking 1 tab daily. +some constipation.  stimulant effects have worn off, doesn’t feel decr appetite anymore\par - increasing walking - 1 mile per day.  Something similar to Scottie. Also going in the pool. \par - around 3pm, incr hunger - has had chocolate recently "I think I'm PMS'ing"\par - looking forward to walking more, avoiding alcohol and seeing 3-5 lbs weight loss per month. goal 220s in 3-6 months\par \par Has lost 10-15 pounds over the past year.\par \par Obesity began in after pregnancies.  Weight gain has occurred with: unhealthy eating habits\par \par Past weight loss attempts include: WW, Nutrisystem 2021 lost 10, Optavia lost 40, OTC pills.  These have produced a maximum of 30-40 pounds of weight loss.  \par Anti-obesity medications in the past: metformin\par \par Reasons for desiring weight loss: health, confidence\par Perceived obstacles to losing weight: increased snacks. \par \par Sleep: 7-8 hours 10pm -6am. both kids sleep during the night.   is not sure if she snores. generally well rested. \par \par Has 3 regular meals a day. \par \par Diet history:\par wakes up at: 6:45am\par B: 7am  black coffee, "worst meal for breakfast" - cookies in AM\par walking work out or weights 45-60 min + walking 2-4 miles but none in 2 weeks\par L: 1130-12 pm - bagel w tuna fish. Or Mom will pack - leftovers, leaves out starch.  ex. chicken breast and apple. \par D: 5- 515pm - most nights Mom cooks - chicken with pork with veg - roasted broccoli and starch - cous cous At 4-5 times a week.  Or dinosaur nuggets, pesto - at home. \par \par snacks - more desserts.\par snacks: ice cream, cookies, processed carbs \par eating after dinner: typically no. sometimes desserts - ice cream - about 2-3 times a week, large.  An hr after dinner.  At parents.   doesn't have it at her own home. \par overeating episodes: none\par \par Sodas/fast food/processed foods: +fried\par \par Water intake per day:  4 x 16 club soda bottles, about a gallon per day. \par \par Physical activity:\par Patient enjoys: walking, weight training\par Current physical activity treadmill 2-4 miles a day. Weight training 6 days a week\par \par

## 2022-05-31 NOTE — REASON FOR VISIT
[Follow-Up Visit] : a follow-up visit for [Obesity] : obesity [Home] : at home, [unfilled] , at the time of the visit. [Medical Office: ([facility])___] : at the medical office located in

## 2022-06-13 ENCOUNTER — APPOINTMENT (OUTPATIENT)
Dept: BARIATRICS/WEIGHT MGMT | Facility: CLINIC | Age: 37
End: 2022-06-13
Payer: COMMERCIAL

## 2022-06-13 VITALS — WEIGHT: 242 LBS | HEIGHT: 65 IN | BODY MASS INDEX: 40.32 KG/M2

## 2022-06-13 PROCEDURE — 99214 OFFICE O/P EST MOD 30 MIN: CPT | Mod: 95

## 2022-06-16 NOTE — HISTORY OF PRESENT ILLNESS
[Home] : at home, [unfilled] , at the time of the visit. [Other Location: e.g. Home (Enter Location, City,State)___] : at [unfilled] [FreeTextEntry1] : This is a 36 year old female  being seen for obesity followup visit. \par \par Patient's weight unchanged. She has been off phentermine for 2 weeks. She states she stopped feeling the same affect on appetite as she did when she first started it. \par She denies binging\par \par Breakfast is her worst meal of the day. She craves cookies, sugar \par B: oreos with coffee, piece of fruit with oatmeal\par L: protein piece of fruit\par D: protein, veg, starch \par Denies snacking in between meals and after dinner\par \par She sleeps through the night 8-10 hours\par \par She has 15 minute walking/anthony videos at home  \par Walking outside more and in her pool \par

## 2022-06-16 NOTE — ASSESSMENT
[FreeTextEntry1] : BARIATRIC SURGERY HISTORY: none\par OBESITY CO-MORBIDITIES: n/a \par ANTI-OBESITY MEDICATIONS: phentermine\par OBESITY MEDICATION SIDE EFFECTS: none\par \par Plan: \par Discussed high fiber, low fat diet\par avoid added fat, sugar, refined carbohydrates\par front load meals, dinner should be smallest meal \par Add veg and starch to lunch \par hydrate with water\par keep food journal \par discussed importance of regular physical activity. recommended 30 min 5 days a week\par advised to get blood work done \par discuss medication options with Dr Melo at f/u visit \par \par f/u 2 weeks \par

## 2022-06-21 LAB
BASOPHILS # BLD AUTO: 0.1 K/UL
BASOPHILS NFR BLD AUTO: 1.2 %
EOSINOPHIL # BLD AUTO: 0.09 K/UL
EOSINOPHIL NFR BLD AUTO: 1.1 %
ESTIMATED AVERAGE GLUCOSE: 103 MG/DL
HBA1C MFR BLD HPLC: 5.2 %
HCT VFR BLD CALC: 40.8 %
HGB BLD-MCNC: 14.1 G/DL
IMM GRANULOCYTES NFR BLD AUTO: 0.4 %
LYMPHOCYTES # BLD AUTO: 2.11 K/UL
LYMPHOCYTES NFR BLD AUTO: 25.5 %
MAN DIFF?: NORMAL
MCHC RBC-ENTMCNC: 30.9 PG
MCHC RBC-ENTMCNC: 34.6 GM/DL
MCV RBC AUTO: 89.3 FL
MONOCYTES # BLD AUTO: 0.47 K/UL
MONOCYTES NFR BLD AUTO: 5.7 %
NEUTROPHILS # BLD AUTO: 5.48 K/UL
NEUTROPHILS NFR BLD AUTO: 66.1 %
PLATELET # BLD AUTO: 271 K/UL
RBC # BLD: 4.57 M/UL
RBC # FLD: 12.2 %
WBC # FLD AUTO: 8.28 K/UL

## 2022-06-22 LAB
25(OH)D3 SERPL-MCNC: 41.5 NG/ML
ALBUMIN SERPL ELPH-MCNC: 5 G/DL
ALP BLD-CCNC: 74 U/L
ALT SERPL-CCNC: 24 U/L
ANION GAP SERPL CALC-SCNC: 13 MMOL/L
AST SERPL-CCNC: 16 U/L
BILIRUB SERPL-MCNC: 0.4 MG/DL
BUN SERPL-MCNC: 13 MG/DL
CALCIUM SERPL-MCNC: 9.8 MG/DL
CHLORIDE SERPL-SCNC: 103 MMOL/L
CHOLEST SERPL-MCNC: 187 MG/DL
CO2 SERPL-SCNC: 25 MMOL/L
CREAT SERPL-MCNC: 0.8 MG/DL
CRP SERPL HS-MCNC: 3.4 MG/L
EGFR: 99 ML/MIN/1.73M2
FERRITIN SERPL-MCNC: 76 NG/ML
GLUCOSE SERPL-MCNC: 109 MG/DL
HDLC SERPL-MCNC: 58 MG/DL
INSULIN P FAST SERPL-ACNC: 62.7 UU/ML
IRON SATN MFR SERPL: 35 %
IRON SERPL-MCNC: 103 UG/DL
LDLC SERPL CALC-MCNC: 103 MG/DL
NONHDLC SERPL-MCNC: 128 MG/DL
POTASSIUM SERPL-SCNC: 4.1 MMOL/L
PROT SERPL-MCNC: 7.5 G/DL
SODIUM SERPL-SCNC: 141 MMOL/L
T3FREE SERPL-MCNC: 2.93 PG/ML
T4 FREE SERPL-MCNC: 1.1 NG/DL
TIBC SERPL-MCNC: 293 UG/DL
TRIGL SERPL-MCNC: 124 MG/DL
TSH SERPL-ACNC: 0.63 UIU/ML
UIBC SERPL-MCNC: 190 UG/DL

## 2022-06-27 ENCOUNTER — APPOINTMENT (OUTPATIENT)
Dept: BARIATRICS/WEIGHT MGMT | Facility: CLINIC | Age: 37
End: 2022-06-27
Payer: COMMERCIAL

## 2022-06-27 DIAGNOSIS — Z87.891 PERSONAL HISTORY OF NICOTINE DEPENDENCE: ICD-10-CM

## 2022-06-27 DIAGNOSIS — Z78.9 OTHER SPECIFIED HEALTH STATUS: ICD-10-CM

## 2022-06-27 PROCEDURE — 90791 PSYCH DIAGNOSTIC EVALUATION: CPT | Mod: 95

## 2022-06-30 ENCOUNTER — APPOINTMENT (OUTPATIENT)
Dept: BARIATRICS/WEIGHT MGMT | Facility: CLINIC | Age: 37
End: 2022-06-30

## 2022-06-30 VITALS — WEIGHT: 243 LBS | BODY MASS INDEX: 40.44 KG/M2

## 2022-06-30 DIAGNOSIS — R53.81 OTHER MALAISE: ICD-10-CM

## 2022-06-30 DIAGNOSIS — R53.83 OTHER MALAISE: ICD-10-CM

## 2022-06-30 PROCEDURE — 99214 OFFICE O/P EST MOD 30 MIN: CPT | Mod: 95

## 2022-06-30 NOTE — HISTORY OF PRESENT ILLNESS
[FreeTextEntry1] : Bariatric surgery history:  none\par Obesity co-morbidities: depression\par Comorbidities improved or resolved: none\par Anti-obesity medications: phentermine\par Obesity medication side effects: none\par \par Ms. MALISSA RAMSEY is a 36 year year old female who presents for evaluation and treatment of Class 3 obesity. \par \par Obesity related co-morbidities: depression, some snoring. \par \par Last annual - August. Overall things look good.  "feels tired"\par \par Patient lives - with  and 2 kids - 5 and almost 2 y.o. \par Employment status - [employer]\par \par Weight History:\par Lowest adult weight: 136 (age 25)\par Highest adult weight: 275\par \par interim:\par - currently have sinus infection.  \par - started phentermine on Apr 6th - started at 246.  Now taking 1 tab daily. +some constipation.  stimulant effects have worn off, doesn’t feel decr appetite anymore\par - increasing walking - 1 mile per day.  Something similar to Scottie. Also going in the pool. \par - around 3pm, incr hunger - has had chocolate recently "I think I'm PMS'ing"\par - looking forward to walking more, avoiding alcohol and seeing 3-5 lbs weight loss per month. goal 220s in 3-6 months\par \par Has lost 10-15 pounds over the past year.\par \par Obesity began in after pregnancies.  Weight gain has occurred with: unhealthy eating habits\par \par Past weight loss attempts include: WW, Nutrisystem 2021 lost 10, Optavia lost 40, OTC pills.  These have produced a maximum of 30-40 pounds of weight loss.  \par Anti-obesity medications in the past: metformin\par \par Reasons for desiring weight loss: health, confidence\par Perceived obstacles to losing weight: increased snacks. \par \par Sleep: 7-8 hours 10pm -6am. both kids sleep during the night.   is not sure if she snores. generally well rested. \par \par Has 3 regular meals a day. \par \par Diet history:\par wakes up at: 6:45am\par B: 7am  black coffee, "worst meal for breakfast" - cookies in AM\par walking work out or weights 45-60 min + walking 2-4 miles but none in 2 weeks\par L: 1130-12 pm - bagel w tuna fish. Or Mom will pack - leftovers, leaves out starch.  ex. chicken breast and apple. \par D: 5- 515pm - most nights Mom cooks - chicken with pork with veg - roasted broccoli and starch - cous cous At 4-5 times a week.  Or dinosaur nuggets, pesto - at home. \par \par snacks - more desserts.\par snacks: ice cream, cookies, processed carbs \par eating after dinner: typically no. sometimes desserts - ice cream - about 2-3 times a week, large.  An hr after dinner.  At parents.   doesn't have it at her own home. \par overeating episodes: none\par \par Sodas/fast food/processed foods: +fried\par \par Water intake per day:  4 x 16 club soda bottles, about a gallon per day. \par \par Physical activity:\par Patient enjoys: walking, weight training\par Current physical activity treadmill 2-4 miles a day. Weight training 6 days a week\par \par

## 2022-06-30 NOTE — REASON FOR VISIT
[Follow-Up Visit] : a follow-up visit for [Obesity] : obesity [Home] : at home, [unfilled] , at the time of the visit. [Medical Office: ([facility])___] : at the medical office located in

## 2022-06-30 NOTE — ASSESSMENT
[FreeTextEntry1] : 36 y.o F with class 3 obesity, hyperinsulinemia, possible PCOS presents for weight management\par \par - labs - high insulin level.  Will start metformin 500tab qd then incr to bid\par - Restart phentermine 1 tab.  consider 1/2 tab bid, schedule snack - nuts and fruit ~3pm\par - encouraged patient to see lisa surgeon new patient consult - patient agreeable\par - will discuss w  ordering Ozempic from outside\par - f/u NP in 2 weeks\par \par f/u 4 weeks

## 2022-07-15 ENCOUNTER — APPOINTMENT (OUTPATIENT)
Dept: BARIATRICS/WEIGHT MGMT | Facility: CLINIC | Age: 37
End: 2022-07-15

## 2022-07-29 ENCOUNTER — RX CHANGE (OUTPATIENT)
Age: 37
End: 2022-07-29

## 2022-07-29 RX ORDER — METFORMIN HYDROCHLORIDE 500 MG/1
500 TABLET, COATED ORAL TWICE DAILY
Qty: 60 | Refills: 1 | Status: DISCONTINUED | COMMUNITY
Start: 2022-06-30 | End: 2022-07-29

## 2022-08-09 ENCOUNTER — APPOINTMENT (OUTPATIENT)
Dept: BARIATRICS/WEIGHT MGMT | Facility: CLINIC | Age: 37
End: 2022-08-09

## 2022-08-09 VITALS — BODY MASS INDEX: 38.87 KG/M2 | WEIGHT: 233.6 LBS

## 2022-08-09 PROCEDURE — 99214 OFFICE O/P EST MOD 30 MIN: CPT | Mod: 95

## 2022-08-09 NOTE — ASSESSMENT
[FreeTextEntry1] : 36 y.o F with class 3 obesity, hyperinsulinemia, possible PCOS presents for weight management\par \par - will try Mounjaro.  Stay on current metformin dose, can wean off phentermine to 1/2 tab qd only when starting Mounjaro\par - BMs normal\par - labs - high insulin level.  Will start metformin 500tab qd then incr to bid\par - will discuss w  ordering Ozempic from outside\par - f/u NP in 2 weeks\par \par f/u 4-5 weeks

## 2022-08-09 NOTE — HISTORY OF PRESENT ILLNESS
[FreeTextEntry1] : Bariatric surgery history:  none\par Obesity co-morbidities: depression\par Comorbidities improved or resolved: none\par Anti-obesity medications: phentermine\par Obesity medication side effects: none\par \par Ms. MALISSA RAMSEY is a 36 year year old female who presents for evaluation and treatment of Class 3 obesity, now Class 2 obesity. \par \par Obesity related co -morbidities: depression, some snoring. \par \par Last annual - August. Overall things look good.  "feels tired"\par \par Patient lives - with  and 2 kids - 5 and almost 2 y.o. \par Employment status - [employer]\par \par Weight History:\par Lowest adult weight: 136 (age 25)\par Highest adult weight: 275\par \par interim: \par - metformin 500 bid.  doing well with this. feels its helped with 10# weight loss over the last month\par - currently have sinus infection.  \par - water intake has increased.  has been in the pool.  thursday - plays kickball with son\par - 1/2 tab phentermine bid\par - increasing walking - 1 mile per day.  Something similar to Scottie. Also going in the pool. \par - around 3pm, incr hunger - has had chocolate recently "I think I'm PMS'ing"\par - looking forward to walking more, avoiding alcohol and seeing 3-5 lbs weight loss per month. goal 220s in 3-6 months\par \par Has lost 10-15 pounds over the past year.\par \par Obesity began in after pregnancies.  Weight gain has occurred with: unhealthy eating habits\par \par Past weight loss attempts include: WW, Nutrisystem 2021 lost 10, Optavia lost 40, OTC pills.  These have produced a maximum of 30-40 pounds of weight loss.  \par Anti-obesity medications in the past: metformin\par \par Reasons for desiring weight loss: health, confidence\par Perceived obstacles to losing weight: increased snacks. \par \par Sleep: 7-8 hours 10pm -6am. both kids sleep during the night.   is not sure if she snores. generally well rested. \par \par Has 3 regular meals a day. \par \par Diet history:\par wakes up at: 6:45am\par B: 7am  black coffee, "worst meal for breakfast" - cookies in AM\par walking work out or weights 45-60 min + walking 2-4 miles but none in 2 weeks\par L: 1130-12 pm - bagel w tuna fish. Or Mom will pack - leftovers, leaves out starch.  ex. chicken breast and apple. \par D: 5- 515pm - most nights Mom cooks - chicken with pork with veg - roasted broccoli and starch - cous cous At 4-5 times a week.  Or dinosaur nuggets, pesto - at home. \par \par snacks - more desserts.\par snacks: ice cream, cookies, processed carbs \par eating after dinner: typically no. sometimes desserts - ice cream - about 2-3 times a week, large.  An hr after dinner.  At parents.   doesn't have it at her own home. \par overeating episodes: none\par \par Sodas/fast food/processed foods: +fried\par \par Water intake per day:  4 x 16 club soda bottles, about a gallon per day. \par \par Physical activity:\par Patient enjoys: walking, weight training\par Current physical activity treadmill 2-4 miles a day. Weight training 6 days a week\par \par

## 2022-08-09 NOTE — REASON FOR VISIT
[Follow-Up Visit] : a follow-up visit for [Obesity] : obesity [Home] : at home, [unfilled] , at the time of the visit. [Medical Office: ([facility])___] : at the medical office located in

## 2022-09-06 ENCOUNTER — APPOINTMENT (OUTPATIENT)
Dept: BARIATRICS/WEIGHT MGMT | Facility: CLINIC | Age: 37
End: 2022-09-06

## 2022-09-06 VITALS — BODY MASS INDEX: 37.61 KG/M2 | WEIGHT: 226 LBS

## 2022-09-06 PROCEDURE — 99214 OFFICE O/P EST MOD 30 MIN: CPT | Mod: 95

## 2022-09-06 RX ORDER — PHENTERMINE HYDROCHLORIDE 37.5 MG/1
37.5 TABLET ORAL
Qty: 30 | Refills: 0 | Status: DISCONTINUED | COMMUNITY
Start: 2022-04-01 | End: 2022-09-06

## 2022-09-06 NOTE — ASSESSMENT
[FreeTextEntry1] : 36 y.o F with class 3 obesity, hyperinsulinemia, possible PCOS presents for weight management\par \par - increase to 5mg Mounjaro.  incr as tolerated Stay on current metformin dose.  She has stopped phentermine fully.\par - happy w progress - appetite, scale, non-scale victories\par - BMs normal\par - labs - high insulin level.  Will start metformin 500tab qd then incr to bid\par - f/u NP in 2 weeks\par \par f/u 4-5 weeks

## 2022-09-06 NOTE — REASON FOR VISIT
[Follow-Up Visit] : a follow-up visit for [Obesity] : obesity [Home] : at home, [unfilled] , at the time of the visit. [Medical Office: ([facility])___] : at the medical office located in

## 2022-09-06 NOTE — HISTORY OF PRESENT ILLNESS
[FreeTextEntry1] : Bariatric surgery history:  none\par Obesity co-morbidities: depression\par Comorbidities improved or resolved: none\par Anti-obesity medications: Mounjaro, metformin\par Obesity medication side effects: none\par \par Ms. MALISSA RAMSEY is a 36 year year old female who presents for evaluation and treatment of Class 2 obesity, now Class 2 obesity. \par \par Obesity related co -morbidities: depression, some snoring. \par \par Last annual - August. Overall things look good.  "feels tired"\par \par Patient lives - with  and 2 kids - 5 and almost 2 y.o. \par Employment status - [employer]\par \par Weight History:\par Lowest adult weight: 136 (age 25)\par Highest adult weight: 275\par \par interim: \par - taking Mounjaro 2.5mg, having some GERD but goes away\par - metformin 500 bid. \par - water intake has increased.  has been in the pool.  thursday - plays kickball with son\par - has increased steps - 1 mile to walk child to school.  \par - goal 220s in 3-6 months\par \par Has lost 10-15 pounds over the past year.\par \par Obesity began in after pregnancies.  Weight gain has occurred with: unhealthy eating habits\par \par Past weight loss attempts include: WW, Nutrisystem 2021 lost 10, Optavia lost 40, OTC pills.  These have produced a maximum of 30-40 pounds of weight loss.  \par Anti-obesity medications in the past: metformin\par \par Reasons for desiring weight loss: health, confidence\par Perceived obstacles to losing weight: increased snacks. \par \par Sleep: 7-8 hours 10pm -6am. both kids sleep during the night.   is not sure if she snores. generally well rested. \par \par Has 3 regular meals a day. \par \par Diet history:\par wakes up at: 6:45am\par B: 7am  black coffee, "worst meal for breakfast" - cookies in AM\par walking work out or weights 45-60 min + walking 2-4 miles but none in 2 weeks\par L: 1130-12 pm - bagel w tuna fish. Or Mom will pack - leftovers, leaves out starch.  ex. chicken breast and apple. \par D: 5- 515pm - most nights Mom cooks - chicken with pork with veg - roasted broccoli and starch - cous cous At 4-5 times a week.  Or dinosaur nuggets, pesto - at home. \par \par snacks - more desserts.\par snacks: ice cream, cookies, processed carbs \par eating after dinner: typically no. sometimes desserts - ice cream - about 2-3 times a week, large.  An hr after dinner.  At parents.   doesn't have it at her own home. \par overeating episodes: none\par \par Sodas/fast food/processed foods: +fried\par \par Water intake per day:  4 x 16 club soda bottles, about a gallon per day. \par \par Physical activity:\par Patient enjoys: walking, weight training\par Current physical activity treadmill 2-4 miles a day. Weight training 6 days a week\par \par

## 2022-10-03 ENCOUNTER — APPOINTMENT (OUTPATIENT)
Dept: BARIATRICS/WEIGHT MGMT | Facility: CLINIC | Age: 37
End: 2022-10-03

## 2022-10-03 VITALS — WEIGHT: 221 LBS | BODY MASS INDEX: 36.78 KG/M2

## 2022-10-03 PROCEDURE — 99214 OFFICE O/P EST MOD 30 MIN: CPT | Mod: 95

## 2022-10-03 NOTE — ASSESSMENT
[FreeTextEntry1] : 36 y.o F with class 3 obesity now Class 2 obesity, hyperinsulinemia, possible PCOS presents for weight management\par \par - increase to 7.5mg Mounjaro.  incr as tolerated Stay on current metformin dose bid.  She has stopped phentermine fully.\par - concerned about [employer] card not working anymore, discussed we would do a PA for continuation of therapy, and failure of metformin\par - happy w progress - appetite, scale, non-scale victories\par - BMs normal\par - labs - high insulin level.  \par - f/u NP in 2 weeks\par \par f/u 4-5 weeks\par \par

## 2022-10-03 NOTE — REASON FOR VISIT
[Follow-Up Visit] : a follow-up visit for [Obesity] : obesity [Home] : at home, [unfilled] , at the time of the visit. [Medical Office: ([facility])___] : at the medical office located in

## 2022-11-10 ENCOUNTER — APPOINTMENT (OUTPATIENT)
Dept: BARIATRICS/WEIGHT MGMT | Facility: CLINIC | Age: 37
End: 2022-11-10

## 2022-11-10 VITALS — WEIGHT: 216.2 LBS | BODY MASS INDEX: 35.98 KG/M2

## 2022-11-10 PROCEDURE — 99214 OFFICE O/P EST MOD 30 MIN: CPT | Mod: 95

## 2022-11-10 NOTE — REASON FOR VISIT
[Follow-Up Visit] : a follow-up visit for [Obesity] : obesity [Home] : at home, [unfilled] , at the time of the visit. [Medical Office: ([facility])___] : at the medical office located in

## 2022-11-10 NOTE — ASSESSMENT
[FreeTextEntry1] : 36 y.o F with class 3 obesity now Class 2 obesity, hyperinsulinemia, possible PCOS presents for weight management\par \par - Taking Mounjaro 10mg. Wants to stay on for next month as well. Stay on current metformin dose bid.  She has stopped phentermine fully.\par - concerned about [employer] card not working anymore, discussed we would do a PA for continuation of therapy, and failure of metformin\par - new goal of 200 in year 2023, slow and steady. 2 mile walks help a lot\par - happy w progress - appetite, scale, non-scale victories\par - BMs normal\par - labs - high insulin level.  \par \par f/u 6-8 weeks\par \par

## 2022-11-10 NOTE — HISTORY OF PRESENT ILLNESS
[FreeTextEntry1] : Bariatric surgery history:  none\par Obesity co - morbidities: depression\par Comorbidities improved or resolved: none\par Anti-obesity medications: Mounjaro 10mg, metformin\par Obesity medication side effects: none\par \par Ms. MALISSA RAMSEY is a 36 year year old female who presents for evaluation and treatment of Class 2 obesity, now Class 2 obesity. \par \par Obesity related co -morbidities: depression, some snoring. \par \par Last annual - August. Overall things look good.  "feels tired"\par \par Patient lives - with  and 2 kids - 5 and almost 2 y.o. \par Employment status - [employer]\par \par Weight History:\par Lowest adult weight: 136 (age 25)\par Highest adult weight: 275\par \par interim: \par - taking Mounjaro 10mg, having some GERD but goes away. no constipation or nauseated.\par - 2 miles every day with her son\par - metformin 500 bid. \par - water intake has increased.  has been in the pool.  thursday - plays kickball with son\par - has increased steps - 1 mile to walk child to school.  \par - goal 220s in 3-6 months\par \par Has lost 10-15 pounds over the past year.\par \par Obesity began in after pregnancies.  Weight gain has occurred with: unhealthy eating habits\par \par Past weight loss attempts include: WW, Nutrisystem 2021 lost 10, Optavia lost 40, OTC pills.  These have produced a maximum of 30-40 pounds of weight loss.  \par Anti-obesity medications in the past: metformin\par \par Reasons for desiring weight loss: health, confidence\par Perceived obstacles to losing weight: increased snacks. \par \par Sleep: 7-8 hours 10pm -6am. both kids sleep during the night.   is not sure if she snores. generally well rested. \par \par Has 3 regular meals a day. \par \par Diet history:\par wakes up at: 6:45am\par B: 7am  black coffee, "worst meal for breakfast" - cookies in AM\par walking work out or weights 45-60 min + walking 2-4 miles but none in 2 weeks\par L: 1130-12 pm - bagel w tuna fish. Or Mom will pack - leftovers, leaves out starch.  ex. chicken breast and apple. \par D: 5- 515pm - most nights Mom cooks - chicken with pork with veg - roasted broccoli and starch - cous cous At 4-5 times a week.  Or dinosaur nuggets, pesto - at home. \par \par snacks - more desserts.\par snacks: ice cream, cookies, processed carbs \par eating after dinner: typically no. sometimes desserts - ice cream - about 2-3 times a week, large.  An hr after dinner.  At parents.   doesn't have it at her own home. \par overeating episodes: none\par \par Sodas/fast food/processed foods: +fried\par \par Water intake per day:  4 x 16 club soda bottles, about a gallon per day. \par \par Physical activity:\par Patient enjoys: walking, weight training\par Current physical activity treadmill 2-4 miles a day. Weight training 6 days a week\par \par

## 2022-12-29 ENCOUNTER — APPOINTMENT (OUTPATIENT)
Dept: BARIATRICS/WEIGHT MGMT | Facility: CLINIC | Age: 37
End: 2022-12-29
Payer: COMMERCIAL

## 2022-12-29 VITALS — BODY MASS INDEX: 33.12 KG/M2 | WEIGHT: 199 LBS

## 2022-12-29 PROCEDURE — 99214 OFFICE O/P EST MOD 30 MIN: CPT | Mod: 95

## 2022-12-29 NOTE — REASON FOR VISIT
[Follow-Up Visit] : a follow-up visit for [Obesity] : obesity [Home] : at home, [unfilled] , at the time of the visit. [Medical Office: ([facility])___] : at the medical office located in

## 2022-12-30 RX ORDER — TIRZEPATIDE 5 MG/.5ML
5 INJECTION, SOLUTION SUBCUTANEOUS
Qty: 4 | Refills: 0 | Status: DISCONTINUED | COMMUNITY
Start: 2022-08-09 | End: 2022-12-30

## 2022-12-30 NOTE — HISTORY OF PRESENT ILLNESS
[FreeTextEntry1] : Bariatric surgery history:  none\par Obesity co - morbidities: depression\par Comorbidities improved or resolved: none\par Anti-obesity medications: metformin\par Obesity medication side effects: none\par \par Ms. MALISSA RAMSEY is a 36 year year old female who presents for evaluation and treatment of Class 2 obesity, now Class 1 obesity. \par \par Obesity related co -morbidities: depression, some snoring. \par \par Last annual - August. Overall things look good.  "feels tired"\par \par Patient lives - with  and 2 kids - 5 and almost 2 y.o. \par Employment status - [employer]\par \par Weight History:\par Lowest adult weight: 136 (age 25)\par Highest adult weight: 275\par \par interim: \par - down to 198# then found out she was pregnant.  Saw OB - will discuss plan.  Discussed with Dr. Torres - continue metformin, d/c Mounjaro.\par - GYN - [facility] - Dr. Torres.  Rec - 30 lb weight gain during pregnancy max.   In the past, she has gained about 100 lbs during pregnancy\par - Walking 2 miles every day with her son\par - metformin 500 bid, having some n/v with morning sickness\par - water intake has increased.  has been in the pool.  Thursday - plays kickball with son\par - has increased steps - 1 mile to walk child to school.  \par \par Has lost 10-15 pounds over the past year.\par \par Obesity began in after pregnancies.  Weight gain has occurred with: unhealthy eating habits\par \par Past weight loss attempts include: WW, Nutrisystem 2021 lost 10, Optavia lost 40, OTC pills.  These have produced a maximum of 30-40 pounds of weight loss.  \par Anti-obesity medications in the past: metformin\par \par Reasons for desiring weight loss: health, confidence\par Perceived obstacles to losing weight: increased snacks. \par \par Sleep: 7-8 hours 10pm -6am. both kids sleep during the night.   is not sure if she snores. generally well rested. \par \par Has 3 regular meals a day. \par \par Diet history:\par wakes up at: 6:45am\par B: 7am  black coffee, "worst meal for breakfast" - cookies in AM\par walking work out or weights 45-60 min + walking 2-4 miles but none in 2 weeks\par L: 1130-12 pm - bagel w tuna fish. Or Mom will pack - leftovers, leaves out starch.  ex. chicken breast and apple. \par D: 5- 515pm - most nights Mom cooks - chicken with pork with veg - roasted broccoli and starch - cous cous At 4-5 times a week.  Or dinosaur nuggets, pesto - at home. \par \par snacks - more desserts.\par snacks: ice cream, cookies, processed carbs \par eating after dinner: typically no. sometimes desserts - ice cream - about 2-3 times a week, large.  An hr after dinner.  At parents.   doesn't have it at her own home. \par overeating episodes: none\par \par Sodas/fast food/processed foods: +fried\par \par Water intake per day:  4 x 16 club soda bottles, about a gallon per day. \par \par Physical activity:\par Patient enjoys: walking, weight training\par Current physical activity treadmill 2-4 miles a day. Weight training 6 days a week\par \par

## 2022-12-30 NOTE — ASSESSMENT
[FreeTextEntry1] : 36 y.o F with class 3 obesity now Class 1 obesity, hyperinsulinemia, possible PCOS presents for weight management - now PREGNANT\par \par - [facility] - recommended d/c Mounjaro and patient can continue metformin.  This was shared with the patient.\par - patient can restart metformin 500 bid when she is not having nausea/vomiting. \par - OB Dr Torres rec weight gain - 30 lbs max during pregnancy\par - happy w progress - appetite, scale, non-scale victories\par - goals to - maintain weight during pregnancy, and see most of weight gain in 3rd trimester\par - BMs normal\par - labs - high insulin level.  \par \par f/u 4-6 weeks\par \par

## 2023-01-27 ENCOUNTER — APPOINTMENT (OUTPATIENT)
Dept: BARIATRICS/WEIGHT MGMT | Facility: CLINIC | Age: 38
End: 2023-01-27
Payer: COMMERCIAL

## 2023-01-27 VITALS — WEIGHT: 214 LBS | BODY MASS INDEX: 35.61 KG/M2

## 2023-01-27 PROCEDURE — 99214 OFFICE O/P EST MOD 30 MIN: CPT | Mod: 95

## 2023-01-29 NOTE — REASON FOR VISIT
[Follow-Up Visit] : a follow-up visit for [Obesity] : obesity [Home] : at home, [unfilled] , at the time of the visit. [Medical Office: ([facility])___] : at the medical office located in  [Follow-Up] : a follow-up visit

## 2023-01-29 NOTE — HISTORY OF PRESENT ILLNESS
[FreeTextEntry1] : Bariatric surgery history:  none\par Obesity co - morbidities: depression\par Comorbidities improved or resolved: none\par Anti-obesity medications: metformin\par Obesity medication side effects: none\par \par Ms. MALISSA RAMSEY is a 36 year year old female who presents for evaluation and treatment of Class 2 obesity, now Class 1 obesity. \par \par Obesity related co -morbidities: depression, some snoring. \par \par Last annual - August. Overall things look good.  "feels tired"\par \par Patient lives - with  and 2 kids - 5 and almost 2 y.o. \par Employment status - [employer]\par \par Weight History:\par Lowest adult weight: 136 (age 25)\par Highest adult weight: 275\par \par interim: \par - has gained about 15# during first trimester. She is concerned\par  Saw OB - will discuss plan.  Discussed with Dr. Torres - continue metformin, d/c Mounjaro.  Taking metformin 500 bid. \par - GYN - [facility] - Dr. Torres.  Rec - 30 lb weight gain during pregnancy max.   In the past, she has gained about 100 lbs during pregnancy\par - Walking 1.5 miles on treadmill daily.  Would like to work toward 3 miles again.\par - water intake has increased.  has been in the pool.  Thursday - plays kickball with son\par - has increased steps - 1 mile to walk child to school.  \par \par Has lost 10-15 pounds over the past year.\par \par Obesity began in after pregnancies.  Weight gain has occurred with: unhealthy eating habits\par \par Past weight loss attempts include: WW, Nutrisystem 2021 lost 10, Optavia lost 40, OTC pills.  These have produced a maximum of 30-40 pounds of weight loss.  \par Anti-obesity medications in the past: metformin\par \par Reasons for desiring weight loss: health, confidence\par Perceived obstacles to losing weight: increased snacks. \par \par Sleep: 7-8 hours 10pm -6am. both kids sleep during the night.   is not sure if she snores. generally well rested. \par \par Eating chopped up salad, cherry tomatoes\par Has 3 regular meals a day. \par \par Diet history:\par wakes up at: 6:45am\par B: 7am  black coffee, "worst meal for breakfast" - cookies in AM.  These days - sometimes skips bc she's not feeling like she wants to eat.  \par walking work out or weights 45-60 min + walking 2-4 miles but none in 2 weeks\par L: 1130-12 pm - bagel w tuna fish. Or Mom will pack - leftovers, leaves out starch.  ex. chicken breast and apple. \par D: 5- 515pm - most nights Mom cooks - chicken with pork with veg - roasted broccoli and starch - cous cous At 4-5 times a week.  Or dinosaur nuggets, pesto - at home. \par \par snacks - more desserts.\par snacks: ice cream, cookies, processed carbs \par eating after dinner: typically no. sometimes desserts - ice cream - about 2-3 times a week, large.  An hr after dinner.  At parents.   doesn't have it at her own home. \par overeating episodes: none\par \par Sodas/fast food/processed foods: +fried\par \par Water intake per day:  4 x 16 club soda bottles, about a gallon per day. \par \par Physical activity:\par Patient enjoys: walking, weight training\par Current physical activity treadmill 2-4 miles a day. Weight training 6 days a week\par \par

## 2023-01-29 NOTE — ASSESSMENT
[FreeTextEntry1] : 36 y.o F with class 3 obesity now Class 1 obesity, hyperinsulinemia, possible PCOS presents for weight management - now PREGNANT\par \par - continue metformin 500 bid\par - discussed smaller meals during the day, more frequency 4-5 times- no processed foods, fruit/veg/beans and legumes  limited crackers/bread\par - avoid night snacking\par - [facility] - recommended d/c Mounjaro and patient can continue metformin.  This was shared with the patient.\par - OB Dr Torres rec weight gain - 30 lbs max during pregnancy\par - BMs normal\par - labs - high insulin level.  \par \par f/u 4-6 weeks\par \par

## 2023-02-28 ENCOUNTER — APPOINTMENT (OUTPATIENT)
Dept: BARIATRICS/WEIGHT MGMT | Facility: CLINIC | Age: 38
End: 2023-02-28
Payer: COMMERCIAL

## 2023-02-28 VITALS — BODY MASS INDEX: 39.11 KG/M2 | WEIGHT: 235 LBS

## 2023-02-28 PROCEDURE — 99214 OFFICE O/P EST MOD 30 MIN: CPT | Mod: 95

## 2023-02-28 NOTE — ASSESSMENT
[FreeTextEntry1] : 36 y.o F with class 3 obesity now Class 2 obesity, hyperinsulinemia, possible PCOS presents for weight management - now PREGNANT\par \par - continue metformin 500 once a day, in the morning - 7am.  she stopped taking evening dose bc made her nauseated\par - she's accepting that she's going to gain weight w pregnancy and she can only do what she can to minimize\par - continues water intake, continue physical activity - 3 miles 4-5 times a week\par - discussed smaller meals during the day, more frequency 4-5 times- no processed foods, fruit/veg/beans and legumes  limited crackers/bread\par - avoid night snacking\par - previously called Dr. Torres at [facility] - recommended d/c Mounjaro and patient can continue metformin.  This was shared with the patient.\par - OB Dr Torres rec weight gain - 30 lbs max during pregnancy\par - BMs normal\par - labs - high insulin level.  \par \par f/u 4-6 weeks\par \par

## 2023-02-28 NOTE — HISTORY OF PRESENT ILLNESS
[FreeTextEntry1] : Bariatric surgery history:  none\par Obesity co - morbidities: depression\par Comorbidities improved or resolved: none\par Anti-obesity medications: metformin\par Obesity medication side effects: none\par \par Ms. MALISSA RAMSEY is a 37 year year old female who presents for evaluation and treatment of Class 2 obesity.\par \par Obesity related co -morbidities: depression, some snoring. \par \par Last annual - August. Overall things look good.  "feels tired"\par \par Patient lives - with  and 2 kids - 5 and almost 2 y.o. \par Employment status - [employer]\par \par Weight History:\par Lowest adult weight: 136 (age 25)\par Highest adult weight: 275\par \par interim: \par - has gained about 15# during first trimester. She is concerned\par - 15-16 weeks along\par - swapping hummus, for avocado. more beans and lentils.  started walking again, almost 3 miles 4-5 days a week\par - smaller meals\par  Saw OB - will discuss plan.  Discussed with Dr. Torres - continue metformin, d/c Mounjaro.  Taking metformin 500 bid. \par - [facility] - Dr. Torres.  Rec - 30 lb weight gain during pregnancy max.   In the past, she has gained about 100 lbs during pregnancy\par - water intake has increased.  has been in the pool.  Thursday - plays kickball with son\par - has increased steps - 1 mile to walk child to school.  \par \par Has lost 10-15 pounds over the past year.\par \par Obesity began in after pregnancies.  Weight gain has occurred with: unhealthy eating habits\par \par Past weight loss attempts include: WW, Nutrisystem 2021 lost 10, Optavia lost 40, OTC pills.  These have produced a maximum of 30-40 pounds of weight loss.  \par Anti-obesity medications in the past: metformin\par \par Reasons for desiring weight loss: health, confidence\par Perceived obstacles to losing weight: increased snacks. \par \par Sleep: 7-8 hours 10pm -6am. both kids sleep during the night.   is not sure if she snores. generally well rested. \par \par Eating chopped up salad, cherry tomatoes\par Has 3 regular meals a day. \par \par Diet history:\par wakes up at: 6:45am\par B: 7am  black coffee, "worst meal for breakfast" - cookies in AM.  These days - sometimes skips bc she's not feeling like she wants to eat.  \par walking work out or weights 45-60 min + walking 2-4 miles but none in 2 weeks\par L: 1130-12 pm - bagel w tuna fish. Or Mom will pack - leftovers, leaves out starch.  ex. chicken breast and apple. \par D: 5- 515pm - most nights Mom cooks - chicken with pork with veg - roasted broccoli and starch - cous cous At 4-5 times a week.  Or dinosaur nuggets, pesto - at home. \par \par snacks - more desserts.\par snacks: ice cream, cookies, processed carbs \par eating after dinner: typically no. sometimes desserts - ice cream - about 2-3 times a week, large.  An hr after dinner.  At parents.   doesn't have it at her own home. \par overeating episodes: none\par \par Sodas/fast food/processed foods: +fried\par \par Water intake per day:  4 x 16 club soda bottles, about a gallon per day. \par \par Physical activity:\par Patient enjoys: walking, weight training\par Current physical activity treadmill 2-4 miles a day. Weight training 6 days a week\par \par

## 2023-02-28 NOTE — REASON FOR VISIT
[Follow-Up] : a follow-up visit [Follow-Up Visit] : a follow-up visit for [Obesity] : obesity [Home] : at home, [unfilled] , at the time of the visit. [Medical Office: ([facility])___] : at the medical office located in

## 2023-03-29 ENCOUNTER — APPOINTMENT (OUTPATIENT)
Dept: CARDIOLOGY | Facility: CLINIC | Age: 38
End: 2023-03-29

## 2023-04-03 ENCOUNTER — APPOINTMENT (OUTPATIENT)
Dept: ANTEPARTUM | Facility: CLINIC | Age: 38
End: 2023-04-03
Payer: COMMERCIAL

## 2023-04-03 ENCOUNTER — ASOB RESULT (OUTPATIENT)
Age: 38
End: 2023-04-03

## 2023-04-03 PROCEDURE — 76811 OB US DETAILED SNGL FETUS: CPT

## 2023-04-11 ENCOUNTER — APPOINTMENT (OUTPATIENT)
Dept: CARDIOLOGY | Facility: CLINIC | Age: 38
End: 2023-04-11

## 2023-04-13 ENCOUNTER — APPOINTMENT (OUTPATIENT)
Dept: BARIATRICS/WEIGHT MGMT | Facility: CLINIC | Age: 38
End: 2023-04-13
Payer: COMMERCIAL

## 2023-04-13 VITALS — BODY MASS INDEX: 41.27 KG/M2 | WEIGHT: 248 LBS

## 2023-04-13 DIAGNOSIS — Z13.220 ENCOUNTER FOR SCREENING FOR LIPOID DISORDERS: ICD-10-CM

## 2023-04-13 PROCEDURE — 99214 OFFICE O/P EST MOD 30 MIN: CPT | Mod: 95

## 2023-04-13 RX ORDER — METFORMIN HYDROCHLORIDE 500 MG/1
500 TABLET, COATED ORAL
Qty: 180 | Refills: 1 | Status: DISCONTINUED | COMMUNITY
Start: 2022-07-29 | End: 2023-04-13

## 2023-04-13 NOTE — HISTORY OF PRESENT ILLNESS
[FreeTextEntry1] : Bariatric surgery history:  none\par Obesity co - morbidities: depression\par Comorbidities improved or resolved: none\par Anti-obesity medications: metformin\par Obesity medication side effects: none\par \par Ms. MALISSA RAMSEY is a 37 year year old female who presents for evaluation and treatment of Class 2 obesity.\par \par Obesity related co -morbidities: depression, some snoring. \par \par Last annual - August. Overall things look good.  "feels tired"\par \par Patient lives - with  and 2 kids - 5 and almost 2 y.o. \par Employment status - [employer]\par \par Weight History:\par Lowest adult weight: 136 (age 25)\par Highest adult weight: 275\par \par interim: \par - has gained about 15# during first trimester. \par - midnight - 12pm might have a pickle or handful of cashews\par - seeing neurologist for some fainting episodes\par - due due around July/August\par - swapping hummus, for avocado. more beans and lentils.  started walking again, almost 3 miles 4-5 days a week\par - smaller meals\par - continue metformin, has stopped Mounjaro bc of pregnancy.  Taking metformin 500 bid. \par - has done EEG, EKG, stress test\par - [facility] - Dr. Torres.  Rec - 30 lb weight gain during pregnancy max.   In the past, she has gained about 100 lbs during pregnancy\par - water intake has increased.  has been in the pool.  Thursday - plays kickball with son\par \par Has lost 10-15 pounds over the past year.\par \par Obesity began in after pregnancies.  Weight gain has occurred with: unhealthy eating habits\par \par Past weight loss attempts include: WW, Nutrisystem 2021 lost 10, Optavia lost 40, OTC pills.  These have produced a maximum of 30-40 pounds of weight loss.  \par Anti-obesity medications in the past: metformin\par \par Reasons for desiring weight loss: health, confidence\par Perceived obstacles to losing weight: increased snacks. \par \par Sleep: 7-8 hours 10pm -6am. both kids sleep during the night.   is not sure if she snores. generally well rested. \par \par Eating chopped up salad, cherry tomatoes\par Has 3 regular meals a day. \par \par Diet history:\par wakes up at: 6:45am\par B: 7am  black coffee, "worst meal for breakfast" - cookies in AM.  These days - sometimes skips bc she's not feeling like she wants to eat.  \par walking work out or weights 45-60 min + walking 2-4 miles but none in 2 weeks\par L: 1130-12 pm - bagel w tuna fish. Or Mom will pack - leftovers, leaves out starch.  ex. chicken breast and apple. \par D: 5- 515pm - most nights Mom cooks - chicken with pork with veg - roasted broccoli and starch - cous cous At 4-5 times a week.  Or dinosaur nuggets, pesto - at home. \par \par snacks - more desserts.\par snacks: ice cream, cookies, processed carbs \par eating after dinner: typically no. sometimes desserts - ice cream - about 2-3 times a week, large.  An hr after dinner.  At parents.   doesn't have it at her own home. \par overeating episodes: none\par \par Sodas/fast food/processed foods: +fried\par \par Water intake per day:  4 x 16 club soda bottles, about a gallon per day. \par \par Physical activity:\par Patient enjoys: walking, weight training\par Current physical activity treadmill 2-4 miles a day. Weight training 6 days a week\par \par

## 2023-04-13 NOTE — REASON FOR VISIT
[Follow-Up] : a follow-up visit [Follow-Up Visit] : a follow-up visit for [Obesity] : obesity [Home] : at home, [unfilled] , at the time of the visit. [Medical Office: ([facility])___] : at the medical office located in

## 2023-06-01 ENCOUNTER — APPOINTMENT (OUTPATIENT)
Dept: BARIATRICS/WEIGHT MGMT | Facility: CLINIC | Age: 38
End: 2023-06-01
Payer: COMMERCIAL

## 2023-06-01 VITALS — BODY MASS INDEX: 41.94 KG/M2 | WEIGHT: 252 LBS

## 2023-06-01 PROCEDURE — 99214 OFFICE O/P EST MOD 30 MIN: CPT | Mod: 95

## 2023-06-01 NOTE — REVIEW OF SYSTEMS
[Negative] : Heme/Lymph [MED-ROS-Cons-FT] : fatigue [MED-ROS-Genituro-FT] : pelvic pains with walking stairs [MED-ROS-Integum-FT] : L leg swelling [MED-ROS-Endo-FT] : fatigue, incr appetite

## 2023-06-01 NOTE — ASSESSMENT
[FreeTextEntry1] : 36 y.o F with class 3 obesity now Class 2 obesity, hyperinsulinemia, possible PCOS presents for weight management - now pregnant, 3rd trimster\par \par - will f/u in September.  would like to restart mounjaro if possible bc it was working so well for her pre-pregnancy.  usually breastfeeds for 3 months but will f/uj\par - previously stopped metformin in pregnancy, due to caution with fainting episodes.\par - L leg swollen due to pregnancy\par - not snacking at night\par - understands in third trimester may gain weight, c/s 8/7\par - continues water intake, continue physical activity - 3 miles 4-5 times a week\par - discussed smaller meals during the day, more frequency 4-5 times- no processed foods, fruit/veg/beans and legumes  limited crackers/bread\par - avoid night snacking\par - previously called Dr. Torres at [facility] - recommended d/c Mounjaro. now also off metformin\par - OB Dr Torres rec weight gain - 30 lbs max during pregnancy\par - BMs normal\par - labs - high insulin level.  \par \par f/u 4-6 weeks\par \par

## 2023-06-01 NOTE — REASON FOR VISIT
[Follow-Up] : a follow-up visit [Follow-Up Visit] : a follow-up visit for [Obesity] : obesity [Home] : at home, [unfilled] , at the time of the visit. [Medical Office: ([facility])___] : at the medical office located in

## 2023-06-01 NOTE — HISTORY OF PRESENT ILLNESS
[FreeTextEntry1] : Bariatric surgery history:  none\par Obesity co - morbidities: depression\par Comorbidities improved or resolved: none\par Anti-obesity medications: metformin\par Obesity medication side effects: none\par \par Ms. MALISSA RAMSEY is a 37 year year old female who presents for evaluation and treatment of Class 2 obesity.\par \par Obesity related co -morbidities: depression, some snoring. \par \par Last annual - August. Overall things look good.  "feels tired"\par \par Patient lives - with  and 2 kids - 5 and almost 2 y.o. \par Employment status - [employer]\par \par Weight History:\par Lowest adult weight: 136 (age 25)\par Highest adult weight: 275\par \par interim: \par - midnight - 12pm might have a pickle or handful of cashews\par - seeing neurologist for some fainting episodes. no more fainting. \par - major migraines q1-2 days.  taking excedrin\par - due due around July/August, c-sec Aug 7th \par - swapping hummus, for avocado. more beans and lentils.  started walking again, almost 3 miles 4-5 days a week\par - smaller meals\par - continue metformin, has stopped Mounjaro bc of pregnancy.  Taking metformin 500 bid. \par - has done EEG, EKG, stress test\par - [facility] - Dr. Torres.  Rec - 30 lb weight gain during pregnancy max.   In the past, she has gained about 100 lbs during pregnancy\par - water intake has increased.  has been in the pool.  Thursday - plays kickball with son\par \par Has lost 10-15 pounds over the past year.\par \par Obesity began in after pregnancies.  Weight gain has occurred with: unhealthy eating habits\par \par Past weight loss attempts include: WW, Nutrisystem 2021 lost 10, Optavia lost 40, OTC pills.  These have produced a maximum of 30-40 pounds of weight loss.  \par Anti-obesity medications in the past: metformin\par \par Reasons for desiring weight loss: health, confidence\par Perceived obstacles to losing weight: increased snacks. \par \par Sleep: 7-8 hours 10pm -6am. both kids sleep during the night.   is not sure if she snores. generally well rested. \par \par Eating chopped up salad, cherry tomatoes\par Has 3 regular meals a day. \par \par Diet history:\par wakes up at: 6:45am\par B: 7am  black coffee, "worst meal for breakfast" - cookies in AM.  These days - sometimes skips bc she's not feeling like she wants to eat.  \par walking work out or weights 45-60 min + walking 2-4 miles but none in 2 weeks\par L: 1130-12 pm - bagel w tuna fish. Or Mom will pack - leftovers, leaves out starch.  ex. chicken breast and apple. \par D: 5- 515pm - most nights Mom cooks - chicken with pork with veg - roasted broccoli and starch - cous cous At 4-5 times a week.  Or dinosaur nuggets, pesto - at home. \par \par snacks - more desserts.\par snacks: ice cream, cookies, processed carbs \par eating after dinner: typically no. sometimes desserts - ice cream - about 2-3 times a week, large.  An hr after dinner.  At parents.   doesn't have it at her own home. \par overeating episodes: none\par \par Sodas/fast food/processed foods: +fried\par \par Water intake per day:  4 x 16 club soda bottles, about a gallon per day. \par \par Physical activity:\par Patient enjoys: walking, weight training\par Current physical activity treadmill 2-4 miles a day. Weight training 6 days a week\par \par

## 2023-07-06 ENCOUNTER — APPOINTMENT (OUTPATIENT)
Dept: OBGYN | Facility: CLINIC | Age: 38
End: 2023-07-06
Payer: COMMERCIAL

## 2023-07-06 PROCEDURE — 0502F SUBSEQUENT PRENATAL CARE: CPT

## 2023-07-18 ENCOUNTER — APPOINTMENT (OUTPATIENT)
Dept: OBGYN | Facility: CLINIC | Age: 38
End: 2023-07-18
Payer: COMMERCIAL

## 2023-07-18 PROCEDURE — 0502F SUBSEQUENT PRENATAL CARE: CPT

## 2023-07-18 PROCEDURE — 76818 FETAL BIOPHYS PROFILE W/NST: CPT | Mod: 59

## 2023-07-18 PROCEDURE — 76816 OB US FOLLOW-UP PER FETUS: CPT

## 2023-07-25 ENCOUNTER — APPOINTMENT (OUTPATIENT)
Dept: OBGYN | Facility: CLINIC | Age: 38
End: 2023-07-25
Payer: COMMERCIAL

## 2023-07-25 PROCEDURE — 0502F SUBSEQUENT PRENATAL CARE: CPT

## 2023-07-25 PROCEDURE — 76818 FETAL BIOPHYS PROFILE W/NST: CPT

## 2023-07-27 ENCOUNTER — OUTPATIENT (OUTPATIENT)
Dept: OUTPATIENT SERVICES | Facility: HOSPITAL | Age: 38
LOS: 1 days | End: 2023-07-27
Payer: COMMERCIAL

## 2023-07-27 VITALS
SYSTOLIC BLOOD PRESSURE: 102 MMHG | RESPIRATION RATE: 18 BRPM | WEIGHT: 283.07 LBS | DIASTOLIC BLOOD PRESSURE: 71 MMHG | TEMPERATURE: 98 F | HEART RATE: 109 BPM | OXYGEN SATURATION: 99 % | HEIGHT: 64 IN

## 2023-07-27 DIAGNOSIS — Z98.890 OTHER SPECIFIED POSTPROCEDURAL STATES: Chronic | ICD-10-CM

## 2023-07-27 DIAGNOSIS — Z01.818 ENCOUNTER FOR OTHER PREPROCEDURAL EXAMINATION: ICD-10-CM

## 2023-07-27 DIAGNOSIS — Z87.39 PERSONAL HISTORY OF OTHER DISEASES OF THE MUSCULOSKELETAL SYSTEM AND CONNECTIVE TISSUE: Chronic | ICD-10-CM

## 2023-07-27 DIAGNOSIS — Z34.90 ENCOUNTER FOR SUPERVISION OF NORMAL PREGNANCY, UNSPECIFIED, UNSPECIFIED TRIMESTER: ICD-10-CM

## 2023-07-27 DIAGNOSIS — Z98.891 HISTORY OF UTERINE SCAR FROM PREVIOUS SURGERY: Chronic | ICD-10-CM

## 2023-07-27 DIAGNOSIS — Z33.1 PREGNANT STATE, INCIDENTAL: ICD-10-CM

## 2023-07-27 PROCEDURE — G0463: CPT

## 2023-07-27 PROCEDURE — 86901 BLOOD TYPING SEROLOGIC RH(D): CPT

## 2023-07-27 PROCEDURE — 86850 RBC ANTIBODY SCREEN: CPT

## 2023-07-27 PROCEDURE — 86900 BLOOD TYPING SEROLOGIC ABO: CPT

## 2023-07-27 RX ORDER — GENTAMICIN SULFATE 40 MG/ML
480 VIAL (ML) INJECTION ONCE
Refills: 0 | Status: DISCONTINUED | OUTPATIENT
Start: 2023-08-07 | End: 2023-08-09

## 2023-07-27 RX ORDER — OXYTOCIN 10 UNIT/ML
333.33 VIAL (ML) INJECTION
Qty: 20 | Refills: 0 | Status: DISCONTINUED | OUTPATIENT
Start: 2023-08-07 | End: 2023-08-09

## 2023-07-27 RX ORDER — IBUPROFEN 200 MG
1 TABLET ORAL
Qty: 0 | Refills: 0 | DISCHARGE

## 2023-07-27 RX ORDER — ACETAMINOPHEN 500 MG
2 TABLET ORAL
Qty: 0 | Refills: 0 | DISCHARGE

## 2023-07-27 RX ORDER — SODIUM CHLORIDE 9 MG/ML
1000 INJECTION, SOLUTION INTRAVENOUS
Refills: 0 | Status: DISCONTINUED | OUTPATIENT
Start: 2023-08-07 | End: 2023-08-07

## 2023-07-27 RX ORDER — SODIUM CHLORIDE 9 MG/ML
1000 INJECTION, SOLUTION INTRAVENOUS ONCE
Refills: 0 | Status: DISCONTINUED | OUTPATIENT
Start: 2023-08-07 | End: 2023-08-07

## 2023-07-27 NOTE — OB PST NOTE - NSICDXPASTSURGICALHX_GEN_ALL_CORE_FT
PAST SURGICAL HISTORY:  H/O bilateral breast reduction surgery     H/O osteomyelitis right big toe  infection stomach to groin,  for right great big toe osteomyelitis    History of      History of D&C

## 2023-07-27 NOTE — OB PST NOTE - FALL HARM RISK - UNIVERSAL INTERVENTIONS
Bed in lowest position, wheels locked, appropriate side rails in place/Call bell, personal items and telephone in reach/Instruct patient to call for assistance before getting out of bed or chair/Non-slip footwear when patient is out of bed/[place] to call system/Physically safe environment - no spills, clutter or unnecessary equipment/Purposeful Proactive Rounding/Room/bathroom lighting operational, light cord in reach

## 2023-07-27 NOTE — OB PST NOTE - ASSESSMENT
BERNARDINOI VTE 2.0 SCORE [CLOT updated 2019]    AGE RELATED RISK FACTORS                                                       MOBILITY RELATED FACTORS  [ ] Age 41-60 years                                            (1 Point)                    [ ] Bed rest                                                        (1 Point)  [ ] Age: 61-74 years                                           (2 Points)                  [ ] Plaster cast                                                   (2 Points)  [ ] Age= 75 years                                              (3 Points)                    [ ] Bed bound for more than 72 hours                 (2 Points)    DISEASE RELATED RISK FACTORS                                               GENDER SPECIFIC FACTORS  [ ] Edema in the lower extremities                       (1 Point)              [x ] Pregnancy                                                     (1 Point)  [ ] Varicose veins                                               (1 Point)                     [ ] Post-partum < 6 weeks                                   (1 Point)             [x ] BMI > 25 Kg/m2                                            (1 Point)                     [ ] Hormonal therapy  or oral contraception          (1 Point)                 [ ] Sepsis (in the previous month)                        (1 Point)               [ ] History of pregnancy complications                 (1 point)  [ ] Pneumonia or serious lung disease                                               [ ] Unexplained or recurrent                     (1 Point)           (in the previous month)                               (1 Point)  [ ] Abnormal pulmonary function test                     (1 Point)                 SURGERY RELATED RISK FACTORS  [ ] Acute myocardial infarction                              (1 Point)               [ x]  Section                                             (1 Point)  [ ] Congestive heart failure (in the previous month)  (1 Point)      [ ] Minor surgery                                                  (1 Point)   [ ] Inflammatory bowel disease                             (1 Point)               [ ] Arthroscopic surgery                                        (2 Points)  [ ] Central venous access                                      (2 Points)                [ ] General surgery lasting more than 45 minutes (2 points)  [ ] Malignancy- Present or previous                   (2 Points)                [ ] Elective arthroplasty                                         (5 points)    [ ] Stroke (in the previous month)                          (5 Points)                                                                                                                                                           HEMATOLOGY RELATED FACTORS                                                 TRAUMA RELATED RISK FACTORS  [ ] Prior episodes of VTE                                     (3 Points)                [ ] Fracture of the hip, pelvis, or leg                       (5 Points)  [ ] Positive family history for VTE                         (3 Points)             [ ] Acute spinal cord injury (in the previous month)  (5 Points)  [ ] Prothrombin 03469 A                                     (3 Points)               [ ] Paralysis  (less than 1 month)                             (5 Points)  [ ] Factor V Leiden                                             (3 Points)                  [ ] Multiple Trauma within 1 month                        (5 Points)  [ ] Lupus anticoagulants                                     (3 Points)                                                           [ ] Anticardiolipin antibodies                               (3 Points)                                                       [ ] High homocysteine in the blood                      (3 Points)                                             [ ] Other congenital or acquired thrombophilia      (3 Points)                                                [ ] Heparin induced thrombocytopenia                  (3 Points)                                     Total Score [    3      ]

## 2023-07-27 NOTE — OB PST NOTE - HISTORY OF PRESENT ILLNESS
37yr old female  coming in for repeat  EDC 2023. Pt denies HTN or diabetes. Pt continues to have nausea and occas migraine. Neuro work-uo neg. No sig med hx. Covid hx x2 2021 & 2022

## 2023-08-01 ENCOUNTER — APPOINTMENT (OUTPATIENT)
Age: 38
End: 2023-08-01
Payer: COMMERCIAL

## 2023-08-01 PROCEDURE — 76816 OB US FOLLOW-UP PER FETUS: CPT

## 2023-08-01 PROCEDURE — 59425 ANTEPARTUM CARE ONLY: CPT

## 2023-08-01 PROCEDURE — 76818 FETAL BIOPHYS PROFILE W/NST: CPT | Mod: 59

## 2023-08-01 PROCEDURE — 0502F SUBSEQUENT PRENATAL CARE: CPT

## 2023-08-06 ENCOUNTER — TRANSCRIPTION ENCOUNTER (OUTPATIENT)
Age: 38
End: 2023-08-06

## 2023-08-07 ENCOUNTER — APPOINTMENT (OUTPATIENT)
Dept: OBGYN | Facility: HOSPITAL | Age: 38
End: 2023-08-07
Payer: COMMERCIAL

## 2023-08-07 ENCOUNTER — INPATIENT (INPATIENT)
Facility: HOSPITAL | Age: 38
LOS: 1 days | Discharge: HOME | End: 2023-08-09
Attending: OBSTETRICS & GYNECOLOGY | Admitting: OBSTETRICS & GYNECOLOGY
Payer: COMMERCIAL

## 2023-08-07 VITALS — HEART RATE: 99 BPM | OXYGEN SATURATION: 98 %

## 2023-08-07 DIAGNOSIS — Z98.890 OTHER SPECIFIED POSTPROCEDURAL STATES: Chronic | ICD-10-CM

## 2023-08-07 DIAGNOSIS — Z98.891 HISTORY OF UTERINE SCAR FROM PREVIOUS SURGERY: Chronic | ICD-10-CM

## 2023-08-07 DIAGNOSIS — Z87.39 PERSONAL HISTORY OF OTHER DISEASES OF THE MUSCULOSKELETAL SYSTEM AND CONNECTIVE TISSUE: Chronic | ICD-10-CM

## 2023-08-07 DIAGNOSIS — Z33.1 PREGNANT STATE, INCIDENTAL: ICD-10-CM

## 2023-08-07 PROCEDURE — 88302 TISSUE EXAM BY PATHOLOGIST: CPT | Mod: 26

## 2023-08-07 PROCEDURE — 59514 CESAREAN DELIVERY ONLY: CPT

## 2023-08-07 DEVICE — INTERCEED 3 X 4": Type: IMPLANTABLE DEVICE | Status: FUNCTIONAL

## 2023-08-07 RX ORDER — BUTORPHANOL TARTRATE 2 MG/ML
0.12 INJECTION, SOLUTION INTRAMUSCULAR; INTRAVENOUS EVERY 6 HOURS
Refills: 0 | Status: DISCONTINUED | OUTPATIENT
Start: 2023-08-07 | End: 2023-08-07

## 2023-08-07 RX ORDER — MORPHINE SULFATE 50 MG/1
0.1 CAPSULE, EXTENDED RELEASE ORAL ONCE
Refills: 0 | Status: DISCONTINUED | OUTPATIENT
Start: 2023-08-07 | End: 2023-08-08

## 2023-08-07 RX ORDER — FAMOTIDINE 10 MG/ML
20 INJECTION INTRAVENOUS ONCE
Refills: 0 | Status: COMPLETED | OUTPATIENT
Start: 2023-08-07 | End: 2023-08-07

## 2023-08-07 RX ORDER — HEPARIN SODIUM 5000 [USP'U]/ML
5000 INJECTION INTRAVENOUS; SUBCUTANEOUS EVERY 12 HOURS
Refills: 0 | Status: DISCONTINUED | OUTPATIENT
Start: 2023-08-07 | End: 2023-08-09

## 2023-08-07 RX ORDER — DEXAMETHASONE 0.5 MG/5ML
4 ELIXIR ORAL EVERY 6 HOURS
Refills: 0 | Status: DISCONTINUED | OUTPATIENT
Start: 2023-08-07 | End: 2023-08-09

## 2023-08-07 RX ORDER — SIMETHICONE 80 MG/1
80 TABLET, CHEWABLE ORAL EVERY 4 HOURS
Refills: 0 | Status: DISCONTINUED | OUTPATIENT
Start: 2023-08-07 | End: 2023-08-09

## 2023-08-07 RX ORDER — MAGNESIUM HYDROXIDE 400 MG/1
30 TABLET, CHEWABLE ORAL
Refills: 0 | Status: DISCONTINUED | OUTPATIENT
Start: 2023-08-07 | End: 2023-08-09

## 2023-08-07 RX ORDER — DIPHENHYDRAMINE HCL 50 MG
25 CAPSULE ORAL EVERY 6 HOURS
Refills: 0 | Status: DISCONTINUED | OUTPATIENT
Start: 2023-08-07 | End: 2023-08-09

## 2023-08-07 RX ORDER — SODIUM CHLORIDE 9 MG/ML
1000 INJECTION, SOLUTION INTRAVENOUS
Refills: 0 | Status: DISCONTINUED | OUTPATIENT
Start: 2023-08-07 | End: 2023-08-09

## 2023-08-07 RX ORDER — ONDANSETRON 8 MG/1
4 TABLET, FILM COATED ORAL EVERY 6 HOURS
Refills: 0 | Status: DISCONTINUED | OUTPATIENT
Start: 2023-08-07 | End: 2023-08-09

## 2023-08-07 RX ORDER — NALBUPHINE HYDROCHLORIDE 10 MG/ML
2.5 INJECTION, SOLUTION INTRAMUSCULAR; INTRAVENOUS; SUBCUTANEOUS EVERY 6 HOURS
Refills: 0 | Status: DISCONTINUED | OUTPATIENT
Start: 2023-08-07 | End: 2023-08-09

## 2023-08-07 RX ORDER — LANOLIN
1 OINTMENT (GRAM) TOPICAL EVERY 6 HOURS
Refills: 0 | Status: DISCONTINUED | OUTPATIENT
Start: 2023-08-07 | End: 2023-08-09

## 2023-08-07 RX ORDER — SODIUM CHLORIDE 9 MG/ML
1000 INJECTION, SOLUTION INTRAVENOUS ONCE
Refills: 0 | Status: COMPLETED | OUTPATIENT
Start: 2023-08-07 | End: 2023-08-07

## 2023-08-07 RX ORDER — OXYCODONE HYDROCHLORIDE 5 MG/1
5 TABLET ORAL
Refills: 0 | Status: DISCONTINUED | OUTPATIENT
Start: 2023-08-07 | End: 2023-08-07

## 2023-08-07 RX ORDER — ACETAMINOPHEN 500 MG
975 TABLET ORAL
Refills: 0 | Status: DISCONTINUED | OUTPATIENT
Start: 2023-08-07 | End: 2023-08-09

## 2023-08-07 RX ORDER — OXYCODONE HYDROCHLORIDE 5 MG/1
5 TABLET ORAL ONCE
Refills: 0 | Status: DISCONTINUED | OUTPATIENT
Start: 2023-08-07 | End: 2023-08-09

## 2023-08-07 RX ORDER — IBUPROFEN 200 MG
600 TABLET ORAL EVERY 6 HOURS
Refills: 0 | Status: COMPLETED | OUTPATIENT
Start: 2023-08-07 | End: 2024-07-05

## 2023-08-07 RX ORDER — DIPHENHYDRAMINE HCL 50 MG
25 CAPSULE ORAL EVERY 4 HOURS
Refills: 0 | Status: DISCONTINUED | OUTPATIENT
Start: 2023-08-07 | End: 2023-08-09

## 2023-08-07 RX ORDER — KETOROLAC TROMETHAMINE 30 MG/ML
30 SYRINGE (ML) INJECTION EVERY 6 HOURS
Refills: 0 | Status: DISCONTINUED | OUTPATIENT
Start: 2023-08-07 | End: 2023-08-08

## 2023-08-07 RX ORDER — TETANUS TOXOID, REDUCED DIPHTHERIA TOXOID AND ACELLULAR PERTUSSIS VACCINE, ADSORBED 5; 2.5; 8; 8; 2.5 [IU]/.5ML; [IU]/.5ML; UG/.5ML; UG/.5ML; UG/.5ML
0.5 SUSPENSION INTRAMUSCULAR ONCE
Refills: 0 | Status: DISCONTINUED | OUTPATIENT
Start: 2023-08-07 | End: 2023-08-09

## 2023-08-07 RX ORDER — OXYCODONE HYDROCHLORIDE 5 MG/1
5 TABLET ORAL
Refills: 0 | Status: DISCONTINUED | OUTPATIENT
Start: 2023-08-07 | End: 2023-08-09

## 2023-08-07 RX ORDER — OXYCODONE HYDROCHLORIDE 5 MG/1
10 TABLET ORAL
Refills: 0 | Status: DISCONTINUED | OUTPATIENT
Start: 2023-08-07 | End: 2023-08-07

## 2023-08-07 RX ORDER — CITRIC ACID/SODIUM CITRATE 300-500 MG
15 SOLUTION, ORAL ORAL ONCE
Refills: 0 | Status: COMPLETED | OUTPATIENT
Start: 2023-08-07 | End: 2023-08-07

## 2023-08-07 RX ORDER — NALOXONE HYDROCHLORIDE 4 MG/.1ML
0.1 SPRAY NASAL
Refills: 0 | Status: DISCONTINUED | OUTPATIENT
Start: 2023-08-07 | End: 2023-08-09

## 2023-08-07 RX ORDER — OXYTOCIN 10 UNIT/ML
333.33 VIAL (ML) INJECTION
Qty: 20 | Refills: 0 | Status: DISCONTINUED | OUTPATIENT
Start: 2023-08-07 | End: 2023-08-09

## 2023-08-07 RX ORDER — CHLORHEXIDINE GLUCONATE 213 G/1000ML
1 SOLUTION TOPICAL ONCE
Refills: 0 | Status: COMPLETED | OUTPATIENT
Start: 2023-08-07 | End: 2023-08-07

## 2023-08-07 RX ADMIN — SODIUM CHLORIDE 2000 MILLILITER(S): 9 INJECTION, SOLUTION INTRAVENOUS at 06:05

## 2023-08-07 RX ADMIN — Medication 30 MILLIGRAM(S): at 16:35

## 2023-08-07 RX ADMIN — HEPARIN SODIUM 5000 UNIT(S): 5000 INJECTION INTRAVENOUS; SUBCUTANEOUS at 18:01

## 2023-08-07 RX ADMIN — CHLORHEXIDINE GLUCONATE 1 APPLICATION(S): 213 SOLUTION TOPICAL at 05:45

## 2023-08-07 RX ADMIN — SIMETHICONE 80 MILLIGRAM(S): 80 TABLET, CHEWABLE ORAL at 23:04

## 2023-08-07 RX ADMIN — Medication 30 MILLIGRAM(S): at 22:26

## 2023-08-07 RX ADMIN — Medication 975 MILLIGRAM(S): at 18:38

## 2023-08-07 RX ADMIN — Medication 975 MILLIGRAM(S): at 14:00

## 2023-08-07 RX ADMIN — Medication 30 MILLIGRAM(S): at 17:36

## 2023-08-07 RX ADMIN — Medication 975 MILLIGRAM(S): at 12:50

## 2023-08-07 RX ADMIN — Medication 15 MILLILITER(S): at 07:09

## 2023-08-07 RX ADMIN — Medication 975 MILLIGRAM(S): at 18:01

## 2023-08-07 RX ADMIN — Medication 30 MILLIGRAM(S): at 21:56

## 2023-08-07 RX ADMIN — FAMOTIDINE 20 MILLIGRAM(S): 10 INJECTION INTRAVENOUS at 07:09

## 2023-08-07 RX ADMIN — SIMETHICONE 80 MILLIGRAM(S): 80 TABLET, CHEWABLE ORAL at 18:01

## 2023-08-07 RX ADMIN — NALBUPHINE HYDROCHLORIDE 2.5 MILLIGRAM(S): 10 INJECTION, SOLUTION INTRAMUSCULAR; INTRAVENOUS; SUBCUTANEOUS at 12:18

## 2023-08-07 RX ADMIN — SODIUM CHLORIDE 125 MILLILITER(S): 9 INJECTION, SOLUTION INTRAVENOUS at 06:48

## 2023-08-07 RX ADMIN — SODIUM CHLORIDE 125 MILLILITER(S): 9 INJECTION, SOLUTION INTRAVENOUS at 14:45

## 2023-08-07 NOTE — OB PROVIDER DELIVERY SUMMARY - NSSELHIDDEN_OBGYN_ALL_OB_FT
[NS_[email or web address]_ALL_OB_FT:[id number]_[email or web address]_OB_[id number]_DeliveryRN_OBGYN_ALL_OB_FT:[id number]],[NS_CirculateRN2_OBGYN_ALL_OB_FT:[id number]]

## 2023-08-07 NOTE — PRE-ANESTHESIA EVALUATION ADULT - NSANTHAIRWAYFT_ENT_ALL_CORE
FROM  TM distance >2 finger breadths  Interincisor distance >2 finger breadths  Neck circumference <16cm

## 2023-08-07 NOTE — OB RN DELIVERY SUMMARY - NS_BIRTHTRAUMAA_OBGYN_ALL_OB
Episodes reviewed with Dr. Catalan.  Episodes of atrial tachycardia occurring in January; episode of atrial fibrillation on 12/30.  Continue to monitor.     None

## 2023-08-07 NOTE — OB PROVIDER H&P - HISTORY OF PRESENT ILLNESS
R2 Admission H&P    Subjective  HPI: 37y  @ 40w3d presents for scheduled . +FM. -LOF. -CTXs. -VB. Pt denies any other concerns.  Patient with pLTCS in 2016 due to fetal macrosomia.    – PNC: Denies prenatal issues. GBS neg. EFW 4130g by cam.  – OBHx: 2016 - C/S (10#2);  - C/S (9#2); missed Ab (2019)  – GynHx: denies fibroids, cysts, endometriosis, abnormal pap smears, STIs  – PMH: denies  – PSH: breast reduction (, ); osteomyelitis s/p toe surgery ()  – Psych: postpartum "blues" in 2020  – Social: denies   – Meds: PNV   – Allergies: Keflex and Cefaclor (hives)  – Will accept blood transfusions? Yes

## 2023-08-07 NOTE — OB RN INTRAOPERATIVE NOTE - NSSELHIDDEN_OBGYN_ALL_OB_FT
[NS_DeliveryAttending1_OBGYN_ALL_OB_FT:[id number]],[NS_DeliveryAssist1_OBGYN_ALL_OB_FT:[id number]],[NS_DeliveryRN_OBGYN_ALL_OB_FT:[id number]=],[NS_CirculateRN2_OBGYN_ALL_OB_FT:[id number]]

## 2023-08-07 NOTE — OB PROVIDER H&P - ASSESSMENT
Assessment  37y   @ 40w3d presents for scheduled rLTCS.     Plan  - Admit to L+D. scheduled repeat   - Prenatal issues: none  - ángel and pepciog Arcos MD  PGY2

## 2023-08-07 NOTE — OB PROVIDER H&P - NSHPPHYSICALEXAM_GEN_ALL_CORE
Objective  – VS  T(C): --  HR: 88 (08-07-23 @ 06:17)  BP: 113/61 (08-07-23 @ 06:07)  RR: --  SpO2: 97% (08-07-23 @ 06:17)    Physical Exam  CV: RRR  Pulm: breathing comfortably on RA  Abd: gravid, nontender  Extr: moving all extremities with ease    – FHT: baseline 140, mod variability, +accels, -decels  – [place] Walk: quiet  – EFW: 4130g by sono  – Sono: vertex

## 2023-08-07 NOTE — OB RN INTRAOPERATIVE NOTE - NS_ADDITIONALPROCINFO1_OBGYN_ALL_OB_FT
QBL =   Urine = QBL = 450 ml as per Triton  Urine = 850ml of yellow urine emptied from shaffer catheter in OR

## 2023-08-07 NOTE — OB RN DELIVERY SUMMARY - NS_SEPSISRSKCALC_OBGYN_ALL_OB_FT
EOS calculated successfully. EOS Risk Factor: 0.5/1000 live births ([facility] national incidence); GA=39w;Temp=98.6; ROM=0.017; GBS='Negative'; Antibiotics='No antibiotics or any antibiotics < 2 hrs prior to birth'

## 2023-08-07 NOTE — OB PROVIDER DELIVERY SUMMARY - NSPROVIDERDELIVERYNOTE_OBGYN_ALL_OB_FT
repeat LTCS, uncomplicated  viable female infant, vertex presentation, weight 8#5, Apgars 9/9, cord gasses sent  Grossly normal uterus and ovaries, and left fallopian tube. Right fallopian tube appeared obliterated and adherent to uterus. Fimbriae of R fallopian tube appeared to be within normal limits.  Hysterotomy closed in 2 layers. Interceed placed over hysterotomy and uterus in inverted-T fashion.    EBL: 450  IVF: 2200  UOP: 850    Dictation#: [id number]  Toan Arcos, PGY-2 repeat LTCS and b/l salpingectomy, uncomplicated  viable female infant, vertex presentation, weight 8#5, Apgars 9/9, cord gasses sent  Grossly normal uterus and ovaries, and left fallopian tube. Right fallopian tube appeared obliterated and adherent to uterus. Fimbriae of R fallopian tube appeared to be within normal limits.  Hysterotomy closed in 2 layers. Interceed placed over hysterotomy and uterus in inverted-T fashion.    EBL: 450  IVF: 2200  UOP: 850    Dictation#: [id number]  Toan Arcos, PGY-2

## 2023-08-07 NOTE — OB RN DELIVERY SUMMARY - NSSELHIDDEN_OBGYN_ALL_OB_FT
[NS_DeliveryAttending1_OBGYN_ALL_OB_FT:[id number]],[NS_DeliveryAssist1_OBGYN_ALL_OB_FT:[id number]],[NS_DeliveryRN_OBGYN_ALL_OB_FT:[id number]=],[NS_CirculateRN2_OBGYN_ALL_OB_FT:[id number]=]

## 2023-08-07 NOTE — PRE-ANESTHESIA EVALUATION ADULT - HEART RATE (BEATS/MIN)
93 Imiquimod Counseling:  I discussed with the patient the risks of imiquimod including but not limited to erythema, scaling, itching, weeping, crusting, and pain.  Patient understands that the inflammatory response to imiquimod is variable from person to person and was educated regarded proper titration schedule.  If flu-like symptoms develop, patient knows to discontinue the medication and contact us.

## 2023-08-08 LAB
BASOPHILS # BLD AUTO: 0.05 K/UL — SIGNIFICANT CHANGE UP (ref 0–0.2)
BASOPHILS NFR BLD AUTO: 0.4 % — SIGNIFICANT CHANGE UP (ref 0–2)
EOSINOPHIL # BLD AUTO: 0.04 K/UL — SIGNIFICANT CHANGE UP (ref 0–0.5)
EOSINOPHIL NFR BLD AUTO: 0.3 % — SIGNIFICANT CHANGE UP (ref 0–6)
HCT VFR BLD CALC: 29.7 % — LOW (ref 34.5–45)
HGB BLD-MCNC: 10.3 G/DL — LOW (ref 11.5–15.5)
IMM GRANULOCYTES NFR BLD AUTO: 0.5 % — SIGNIFICANT CHANGE UP (ref 0–0.9)
LYMPHOCYTES # BLD AUTO: 0.8 K/UL — LOW (ref 1–3.3)
LYMPHOCYTES # BLD AUTO: 6.3 % — LOW (ref 13–44)
MCHC RBC-ENTMCNC: 31.5 PG — SIGNIFICANT CHANGE UP (ref 27–34)
MCHC RBC-ENTMCNC: 34.7 GM/DL — SIGNIFICANT CHANGE UP (ref 32–36)
MCV RBC AUTO: 90.8 FL — SIGNIFICANT CHANGE UP (ref 80–100)
MONOCYTES # BLD AUTO: 0.9 K/UL — SIGNIFICANT CHANGE UP (ref 0–0.9)
MONOCYTES NFR BLD AUTO: 7.1 % — SIGNIFICANT CHANGE UP (ref 2–14)
NEUTROPHILS # BLD AUTO: 10.9 K/UL — HIGH (ref 1.8–7.4)
NEUTROPHILS NFR BLD AUTO: 85.4 % — HIGH (ref 43–77)
NRBC # BLD: 0 /100 WBCS — SIGNIFICANT CHANGE UP (ref 0–0)
PLATELET # BLD AUTO: 177 K/UL — SIGNIFICANT CHANGE UP (ref 150–400)
RBC # BLD: 3.27 M/UL — LOW (ref 3.8–5.2)
RBC # FLD: 12.9 % — SIGNIFICANT CHANGE UP (ref 10.3–14.5)
WBC # BLD: 12.76 K/UL — HIGH (ref 3.8–10.5)
WBC # FLD AUTO: 12.76 K/UL — HIGH (ref 3.8–10.5)

## 2023-08-08 RX ORDER — IBUPROFEN 200 MG
600 TABLET ORAL EVERY 6 HOURS
Refills: 0 | Status: DISCONTINUED | OUTPATIENT
Start: 2023-08-08 | End: 2023-08-09

## 2023-08-08 RX ADMIN — Medication 30 MILLIGRAM(S): at 05:25

## 2023-08-08 RX ADMIN — Medication 30 MILLIGRAM(S): at 11:43

## 2023-08-08 RX ADMIN — Medication 30 MILLIGRAM(S): at 05:55

## 2023-08-08 RX ADMIN — Medication 975 MILLIGRAM(S): at 01:00

## 2023-08-08 RX ADMIN — Medication 975 MILLIGRAM(S): at 09:50

## 2023-08-08 RX ADMIN — Medication 975 MILLIGRAM(S): at 20:49

## 2023-08-08 RX ADMIN — Medication 975 MILLIGRAM(S): at 20:19

## 2023-08-08 RX ADMIN — Medication 975 MILLIGRAM(S): at 08:51

## 2023-08-08 RX ADMIN — SIMETHICONE 80 MILLIGRAM(S): 80 TABLET, CHEWABLE ORAL at 20:18

## 2023-08-08 RX ADMIN — Medication 975 MILLIGRAM(S): at 15:26

## 2023-08-08 RX ADMIN — Medication 30 MILLIGRAM(S): at 12:40

## 2023-08-08 RX ADMIN — Medication 600 MILLIGRAM(S): at 17:14

## 2023-08-08 RX ADMIN — HEPARIN SODIUM 5000 UNIT(S): 5000 INJECTION INTRAVENOUS; SUBCUTANEOUS at 17:14

## 2023-08-08 RX ADMIN — Medication 975 MILLIGRAM(S): at 01:30

## 2023-08-08 RX ADMIN — HEPARIN SODIUM 5000 UNIT(S): 5000 INJECTION INTRAVENOUS; SUBCUTANEOUS at 05:25

## 2023-08-08 RX ADMIN — Medication 600 MILLIGRAM(S): at 17:55

## 2023-08-08 RX ADMIN — Medication 600 MILLIGRAM(S): at 23:38

## 2023-08-08 RX ADMIN — Medication 975 MILLIGRAM(S): at 14:30

## 2023-08-09 ENCOUNTER — TRANSCRIPTION ENCOUNTER (OUTPATIENT)
Age: 38
End: 2023-08-09

## 2023-08-09 VITALS
SYSTOLIC BLOOD PRESSURE: 112 MMHG | OXYGEN SATURATION: 97 % | HEART RATE: 81 BPM | RESPIRATION RATE: 18 BRPM | TEMPERATURE: 98 F | DIASTOLIC BLOOD PRESSURE: 77 MMHG

## 2023-08-09 PROCEDURE — 86780 TREPONEMA PALLIDUM: CPT

## 2023-08-09 PROCEDURE — 59025 FETAL NON-STRESS TEST: CPT

## 2023-08-09 PROCEDURE — 86901 BLOOD TYPING SEROLOGIC RH(D): CPT

## 2023-08-09 PROCEDURE — 85025 COMPLETE CBC W/AUTO DIFF WBC: CPT

## 2023-08-09 PROCEDURE — 86769 SARS-COV-2 COVID-19 ANTIBODY: CPT

## 2023-08-09 PROCEDURE — 85027 COMPLETE CBC AUTOMATED: CPT

## 2023-08-09 PROCEDURE — 86900 BLOOD TYPING SEROLOGIC ABO: CPT

## 2023-08-09 PROCEDURE — 59050 FETAL MONITOR W/REPORT: CPT

## 2023-08-09 PROCEDURE — 86850 RBC ANTIBODY SCREEN: CPT

## 2023-08-09 PROCEDURE — 88302 TISSUE EXAM BY PATHOLOGIST: CPT

## 2023-08-09 PROCEDURE — C1765: CPT

## 2023-08-09 PROCEDURE — 36415 COLL VENOUS BLD VENIPUNCTURE: CPT

## 2023-08-09 RX ORDER — IBUPROFEN 200 MG
1 TABLET ORAL
Qty: 0 | Refills: 0 | DISCHARGE
Start: 2023-08-09

## 2023-08-09 RX ORDER — ACETAMINOPHEN 500 MG
3 TABLET ORAL
Qty: 0 | Refills: 0 | DISCHARGE
Start: 2023-08-09

## 2023-08-09 RX ADMIN — Medication 975 MILLIGRAM(S): at 09:59

## 2023-08-09 RX ADMIN — Medication 600 MILLIGRAM(S): at 00:08

## 2023-08-09 RX ADMIN — HEPARIN SODIUM 5000 UNIT(S): 5000 INJECTION INTRAVENOUS; SUBCUTANEOUS at 05:30

## 2023-08-09 RX ADMIN — Medication 600 MILLIGRAM(S): at 06:00

## 2023-08-09 RX ADMIN — Medication 975 MILLIGRAM(S): at 09:22

## 2023-08-09 RX ADMIN — Medication 600 MILLIGRAM(S): at 05:30

## 2023-08-09 RX ADMIN — Medication 600 MILLIGRAM(S): at 11:21

## 2023-08-09 RX ADMIN — Medication 975 MILLIGRAM(S): at 04:02

## 2023-08-09 RX ADMIN — Medication 975 MILLIGRAM(S): at 03:32

## 2023-08-09 NOTE — DISCHARGE NOTE OB - CARE PLAN
1 Principal Discharge DX:	 delivery delivered  Assessment and plan of treatment:	P3 RCS w/ BS  normal PP course  stable for discharge

## 2023-08-09 NOTE — PROGRESS NOTE ADULT - NS PANP COMMENT GEN_ALL_CORE FT
I agree with the resident's note above.    Patient is doing well. Pain is well controlled. Tolerating regular diet, ambulating, and voiding.  Vital signs stable  Incision is c/d/i.    Plan:  Reg diet  Ambulating  Pain control  Routine postpartum care  stable for discharge     fareed allen

## 2023-08-09 NOTE — DISCHARGE NOTE OB - NS MD DC FALL RISK RISK
For information on Fall & Injury Prevention, visit: [email or web address] OR  [email or web address] OR  https://www.cdc.gov/steadi/patient.html

## 2023-08-09 NOTE — DISCHARGE NOTE OB - MEDICATION SUMMARY - MEDICATIONS TO TAKE
I will START or STAY ON the medications listed below when I get home from the hospital:    ibuprofen 600 mg oral tablet  -- 1 tab(s) by mouth every 6 hours  -- Indication: For History of     acetaminophen 325 mg oral tablet  -- 3 tab(s) by mouth every 6 hours  -- Indication: For History of

## 2023-08-09 NOTE — DISCHARGE NOTE OB - PATIENT PORTAL LINK FT
You can access the FollowMyHealth Patient Portal offered by [facility] by registering at the following website: [email or web address]. By joining [employer]’s FollowMyHealth portal, you will also be able to view your health information using other applications (apps) compatible with our system.

## 2023-08-09 NOTE — PROGRESS NOTE ADULT - SUBJECTIVE AND OBJECTIVE BOX
Day 1 of Anesthesia Pain Management Service    SUBJECTIVE: Doing ok  Pain Scale Score:          [X] Refer to charted pain scores    THERAPY:    s/p    100 mcg PF morphine on 8\7\2023      MEDICATIONS  (STANDING):  acetaminophen     Tablet .. 975 milliGRAM(s) Oral <User Schedule>  clindamycin IVPB 900 milliGRAM(s) IV Intermittent once  diphtheria/tetanus/pertussis (acellular) Vaccine (Adacel) 0.5 milliLiter(s) IntraMuscular once  gentamicin   IVPB 480 milliGRAM(s) IV Intermittent once  heparin   Injectable 5000 Unit(s) SubCutaneous every 12 hours  ibuprofen  Tablet. 600 milliGRAM(s) Oral every 6 hours  ketorolac   Injectable 30 milliGRAM(s) IV Push every 6 hours  lactated ringers. 1000 milliLiter(s) (125 mL/Hr) IV Continuous <Continuous>  morphine PF Spinal 0.1 milliGRAM(s) IntraThecal. once  oxytocin Infusion 333.333 milliUNIT(s)/Min (1000 mL/Hr) IV Continuous <Continuous>  oxytocin Infusion 333.333 milliUNIT(s)/Min (1000 mL/Hr) IV Continuous <Continuous>    MEDICATIONS  (PRN):  butorphanol Injectable 0.125 milliGRAM(s) IV Push every 6 hours PRN Pruritus  dexAMETHasone  Injectable 4 milliGRAM(s) IV Push every 6 hours PRN Nausea  diphenhydrAMINE 25 milliGRAM(s) Oral every 6 hours PRN Pruritus  diphenhydrAMINE Injectable 25 milliGRAM(s) IV Push every 4 hours PRN Pruritus  lanolin Ointment 1 Application(s) Topical every 6 hours PRN Sore Nipples  magnesium hydroxide Suspension 30 milliLiter(s) Oral two times a day PRN Constipation  nalbuphine Injectable 2.5 milliGRAM(s) IV Push every 6 hours PRN Pruritus  naloxone Injectable 0.1 milliGRAM(s) IV Push every 3 minutes PRN For ANY of the following changes in patient status:  A. Breaths Per Minute LESS THAN 10, B. Oxygen saturation LESS THAN 90%, C. Sedation score of 6 for Stop After: 4 Times  ondansetron Injectable 4 milliGRAM(s) IV Push every 6 hours PRN Nausea  oxyCODONE    IR 5 milliGRAM(s) Oral every 3 hours PRN Mild Pain (1 - 3)  oxyCODONE    IR 5 milliGRAM(s) Oral every 3 hours PRN Moderate to Severe Pain (4-10)  oxyCODONE    IR 5 milliGRAM(s) Oral once PRN Moderate to Severe Pain (4-10)  oxyCODONE    IR 10 milliGRAM(s) Oral every 3 hours PRN Moderate Pain (4 - 6)  simethicone 80 milliGRAM(s) Chew every 4 hours PRN Gas      OBJECTIVE:    Sedation:        	[X] Alert	 [ ] Drowsy	[ ] Arousable      [ ] Asleep       [ ] Unresponsive    Side Effects:	[X] None 	[ ] Nausea	[ ] Vomiting         [ ] Pruritus  		[ ] Weakness            [ ] Numbness	          [ ] Other:    Vital Signs Last 24 Hrs  T(C): 36.6 (08 Aug 2023 05:47), Max: 36.8 (07 Aug 2023 21:00)  T(F): 97.9 (08 Aug 2023 05:47), Max: 98.3 (07 Aug 2023 21:00)  HR: 89 (08 Aug 2023 05:47) (72 - 95)  BP: 105/66 (08 Aug 2023 05:47) (95/50 - 126/77)  BP(mean): 79 (07 Aug 2023 12:00) (66 - 88)  RR: 18 (08 Aug 2023 05:47) (18 - 23)  SpO2: 96% (08 Aug 2023 05:47) (95% - 100%)    Parameters below as of 08 Aug 2023 05:47  Patient On (Oxygen Delivery Method): room air        ASSESSMENT/ PLAN  [X] Patient transitioned to prn analgesics  [X] Pain management per primary service, pain service to sign off   [X]Documentation and Verification of current medications
S: Patient is doing well without complaints. Tolerates regular diet. She states lochia is in WNL. Ambulating with minimal difficulty. Denies N/V. Voiding freely. Passing flatus. Pain well controlled with oral pain medications. Denies any HA/vision changes, CP/SOB, F/C/S.    O: Vital Signs Last 24 Hrs  T(C): 36.6 (08 Aug 2023 05:47), Max: 36.8 (07 Aug 2023 21:00)  T(F): 97.9 (08 Aug 2023 05:47), Max: 98.3 (07 Aug 2023 21:00)  HR: 89 (08 Aug 2023 05:47) (72 - 95)  BP: 105/66 (08 Aug 2023 05:47) (95/50 - 126/77)  BP(mean): 79 (07 Aug 2023 12:00) (66 - 88)  RR: 18 (08 Aug 2023 05:47) (18 - 23)  SpO2: 96% (08 Aug 2023 05:47) (95% - 100%)    Parameters below as of 08 Aug 2023 05:47  Patient On (Oxygen Delivery Method): room air        Physical Exam:  General: NAD  Abdomen: soft, non-tender, non-distended, fundus firm  Vaginal: deferred  Ext: NTBL    Labs:             10.3   12.76 )-----------( 177      ( 08-08 @ 06:19 )             29.7                   MEDICATIONS  (STANDING):  acetaminophen     Tablet .. 975 milliGRAM(s) Oral <User Schedule>  clindamycin IVPB 900 milliGRAM(s) IV Intermittent once  diphtheria/tetanus/pertussis (acellular) Vaccine (Adacel) 0.5 milliLiter(s) IntraMuscular once  gentamicin   IVPB 480 milliGRAM(s) IV Intermittent once  heparin   Injectable 5000 Unit(s) SubCutaneous every 12 hours  ibuprofen  Tablet. 600 milliGRAM(s) Oral every 6 hours  ketorolac   Injectable 30 milliGRAM(s) IV Push every 6 hours  lactated ringers. 1000 milliLiter(s) (125 mL/Hr) IV Continuous <Continuous>  morphine PF Spinal 0.1 milliGRAM(s) IntraThecal. once  oxytocin Infusion 333.333 milliUNIT(s)/Min (1000 mL/Hr) IV Continuous <Continuous>  oxytocin Infusion 333.333 milliUNIT(s)/Min (1000 mL/Hr) IV Continuous <Continuous>    
Day 1 of Anesthesia Pain Management Service    SUBJECTIVE:  Pain Scale Score:          [X] Refer to charted pain scores    THERAPY: Received PF neuraxial morphine as per pain service nurse's note    OBJECTIVE:    Sedation:        	[X] Alert	[ ] Drowsy	[ ] Arousable      [ ] Asleep       [ ] Unresponsive    Side Effects:	[] None	[ ] Nausea	[ ] Vomiting         [ X] Pruritus  		[ ] Weakness            [ ] Numbness	          [ ] Other:    ASSESSMENT/ PLAN  [X] Patient transitioned to prn analgesics  [X] Pain management per primary service, pain service to sign off   [X]Documentation and Verification of current medications
Patient seen and examined at bedside, no acute overnight events. No acute complaints, pain well controlled. Patient is ambulating, voiding spontaneously, passing flatus, and tolerating regular diet. Denies CP, SOB, N/V, HA, blurred vision, epigastric pain.    Vital Signs Last 24 Hours  T(C): 36.6 (08-09-23 @ 05:15), Max: 37.1 (08-08-23 @ 18:24)  HR: 80 (08-09-23 @ 05:15) (78 - 101)  BP: 117/75 (08-09-23 @ 05:15) (90/58 - 117/75)  RR: 18 (08-09-23 @ 05:15) (18 - 20)  SpO2: 96% (08-09-23 @ 05:15) (95% - 99%)    Physical Exam:  General: NAD  Abdomen: Soft, expected tenderness, non-distended, fundus firm  Incision: Pfannenstiel incision CDI,   Pelvic: Lochia wnl    Labs:    Blood Type: O Positive  Antibody Screen: Negative  RPR: Negative               10.3   12.76 )-----------( 177      ( 08-08 @ 06:19 )             29.7                12.3   9.45  )-----------( 195      ( 08-07 @ 06:36 )             35.3         MEDICATIONS  (STANDING):  acetaminophen     Tablet .. 975 milliGRAM(s) Oral <User Schedule>  clindamycin IVPB 900 milliGRAM(s) IV Intermittent once  diphtheria/tetanus/pertussis (acellular) Vaccine (Adacel) 0.5 milliLiter(s) IntraMuscular once  gentamicin   IVPB 480 milliGRAM(s) IV Intermittent once  heparin   Injectable 5000 Unit(s) SubCutaneous every 12 hours  ibuprofen  Tablet. 600 milliGRAM(s) Oral every 6 hours  lactated ringers. 1000 milliLiter(s) (125 mL/Hr) IV Continuous <Continuous>  oxytocin Infusion 333.333 milliUNIT(s)/Min (1000 mL/Hr) IV Continuous <Continuous>  oxytocin Infusion 333.333 milliUNIT(s)/Min (1000 mL/Hr) IV Continuous <Continuous>    MEDICATIONS  (PRN):  dexAMETHasone  Injectable 4 milliGRAM(s) IV Push every 6 hours PRN Nausea  diphenhydrAMINE 25 milliGRAM(s) Oral every 6 hours PRN Pruritus  diphenhydrAMINE Injectable 25 milliGRAM(s) IV Push every 4 hours PRN Pruritus  lanolin Ointment 1 Application(s) Topical every 6 hours PRN Sore Nipples  magnesium hydroxide Suspension 30 milliLiter(s) Oral two times a day PRN Constipation  nalbuphine Injectable 2.5 milliGRAM(s) IV Push every 6 hours PRN Pruritus  naloxone Injectable 0.1 milliGRAM(s) IV Push every 3 minutes PRN For ANY of the following changes in patient status:  A. Breaths Per Minute LESS THAN 10, B. Oxygen saturation LESS THAN 90%, C. Sedation score of 6 for Stop After: 4 Times  ondansetron Injectable 4 milliGRAM(s) IV Push every 6 hours PRN Nausea  oxyCODONE    IR 5 milliGRAM(s) Oral once PRN Moderate to Severe Pain (4-10)  oxyCODONE    IR 5 milliGRAM(s) Oral every 3 hours PRN Moderate to Severe Pain (4-10)  simethicone 80 milliGRAM(s) Chew every 4 hours PRN Gas

## 2023-08-09 NOTE — PROGRESS NOTE ADULT - ASSESSMENT
pt is a POD#2 s/pRLTCS+ btl. No acute overnight events. Vitals stable.    #Postpatum  - continue with PO analgesia  - increase ambulation  - continue regular diet  - encourage incentive spirometry    Brittani Reynolds PGY1
POD1 s/p repeat c/s and btl.  doing well    continue current management  po pain meds  ambulate  reg diet    Lisette Blackman  OB attg

## 2023-08-09 NOTE — DISCHARGE NOTE OB - CARE PROVIDER_API CALL
Jonna Orozco  Obstetrics and Gynecology  [address] [place] [address], [address]  [place] [address]  Phone: [phone]  Fax: [phone]  Follow Up Time:

## 2023-08-10 RX ORDER — OXYCODONE HYDROCHLORIDE 5 MG/1
1 TABLET ORAL
Qty: 5 | Refills: 0
Start: 2023-08-10

## 2023-08-16 ENCOUNTER — APPOINTMENT (OUTPATIENT)
Dept: OBGYN | Facility: CLINIC | Age: 38
End: 2023-08-16
Payer: COMMERCIAL

## 2023-08-16 PROCEDURE — 0503F POSTPARTUM CARE VISIT: CPT

## 2023-08-20 LAB — SURGICAL PATHOLOGY STUDY: SIGNIFICANT CHANGE UP

## 2023-08-23 NOTE — H&P PST ADULT - CIGARETTE, PACK YRS
2 Complex Repair And V-Y Plasty Text: The defect edges were debeveled with a #15 scalpel blade.  The primary defect was closed partially with a complex linear closure.  Given the location of the remaining defect, shape of the defect and the proximity to free margins a V-Y plasty was deemed most appropriate for complete closure of the defect.  Using a sterile surgical marker, an appropriate advancement flap was drawn incorporating the defect and placing the expected incisions within the relaxed skin tension lines where possible.    The area thus outlined was incised deep to adipose tissue with a #15 scalpel blade.  The skin margins were undermined to an appropriate distance in all directions utilizing iris scissors.

## 2023-09-01 ENCOUNTER — APPOINTMENT (OUTPATIENT)
Dept: BARIATRICS/WEIGHT MGMT | Facility: CLINIC | Age: 38
End: 2023-09-01
Payer: COMMERCIAL

## 2023-09-01 VITALS — WEIGHT: 254 LBS | BODY MASS INDEX: 42.27 KG/M2

## 2023-09-01 PROCEDURE — 99214 OFFICE O/P EST MOD 30 MIN: CPT | Mod: 95

## 2023-09-01 NOTE — HISTORY OF PRESENT ILLNESS
[FreeTextEntry1] : Bariatric surgery history:  none Obesity co - morbidities: depression Comorbidities improved or resolved: none Anti-obesity medications: metformin Obesity medication side effects: none  Ms. MALISSA RAMSEY is a 38 year old female who presents for evaluation and treatment of Class 3 obesity.  Obesity related co -morbidities: depression, some snoring.   Last annual - August. Overall things look good.  "feels tired"  Patient lives - with  and 2 kids - 5 and almost 2 y.o.  Employment status - [employer]  Weight History: Lowest adult weight: 136 (age 25) Highest adult weight: 275  interim:  - 1 month post op , doing well.  pregnancy 293# and now down to 253# - portions are smaller now since pregnancy - 3am - bag of gold fish - swapping hummus, for avocado. more beans and lentils.  started walking again, almost 3 miles 4-5 days a week - continue metformin, has stopped Mounjaro bc of pregnancy.  Taking metformin 500 bid.  - [facility] - Dr. Torres.  Rec - 30 lb weight gain during pregnancy max.   In the past, she has gained about 100 lbs during pregnancy - water intake has increased.  has been in the pool.  Thursday - plays kickball with son  Has lost 10-15 pounds over the past year.  Obesity began in after pregnancies.  Weight gain has occurred with: unhealthy eating habits  Past weight loss attempts include: WW, Nutrisystem  lost 10, Optavia lost 40, OTC pills.  These have produced a maximum of 30-40 pounds of weight loss.   Anti-obesity medications in the past: metformin  Reasons for desiring weight loss: health, confidence Perceived obstacles to losing weight: increased snacks.   Sleep: 7-8 hours 10pm -6am. both kids sleep during the night.   is not sure if she snores. generally well rested.   Eating chopped up salad, cherry tomatoes Has 3 regular meals a day.   Diet history: wakes up at: 6:45am B: 7am  black coffee, "worst meal for breakfast" - cookies in AM.  These days - sometimes skips bc she's not feeling like she wants to eat.   walking work out or weights 45-60 min + walking 2-4 miles but none in 2 weeks L: 1130-12 pm - bagel w tuna fish. Or Mom will pack - leftovers, leaves out starch.  ex. chicken breast and apple.  D: 5- 515pm - most nights Mom cooks - chicken with pork with veg - roasted broccoli and starch - cous cous At 4-5 times a week.  Or dinosaur nuggets, pesto - at home.   snacks - more desserts. snacks: ice cream, cookies, processed carbs  eating after dinner: typically no. sometimes desserts - ice cream - about 2-3 times a week, large.  An hr after dinner.  At parents.   doesn't have it at her own home.  overeating episodes: none  Sodas/fast food/processed foods: +fried  Water intake per day:  4 x 16 club soda bottles, about a gallon per day.   Physical activity: Patient enjoys: walking, weight training Current physical activity treadmill 2-4 miles a day. Weight training 6 days a week

## 2023-09-01 NOTE — ASSESSMENT
[FreeTextEntry1] : 36 y.o F with class 3 obesity now Class 2 obesity, hyperinsulinemia, possible PCOS presents for weight management  - stopping breast feeding in a week.  will try saxenda prior auth, discussed barriers.   - previously stopped metformin in pregnancy, due to caution with fainting episodes. - not snacking at night - continues water intake, continue physical activity - 3 miles 4-5 times a week - discussed smaller meals during the day, more frequency 4-5 times- no processed foods, fruit/veg/beans and legumes  limited crackers/bread - avoid night snacking - BMs normal - labs - high insulin level.    f/u 6-8 weeks

## 2023-09-01 NOTE — REASON FOR VISIT
[Follow-Up] : a follow-up visit [Follow-Up Visit] : a follow-up visit for [Obesity] : obesity [Home] : at home, [unfilled] , at the time of the visit. [Medical Office: ([facility])___] : at the medical office located in  [Verbal consent obtained from patient] : the patient, [unfilled]

## 2023-09-07 NOTE — OB RN PATIENT PROFILE - NSNAMEOFMDOFFICE_OBGYN_ALL_OB_FT
Large Joint Aspiration/Injection: bilateral knee    Date/Time: 9/7/2023 1:30 PM    Performed by: Arely Gale PA-C  Authorized by: Arely Gale PA-C    Consent Done?:  Yes (Verbal)  Indications:  Pain  Site marked: the procedure site was marked    Timeout: prior to procedure the correct patient, procedure, and site was verified    Prep: patient was prepped and draped in usual sterile fashion    Local anesthesia used?: No    Local anesthetic:  Topical anesthetic    Details:  Needle Size:  22 G  Ultrasonic Guidance for needle placement?: No    Approach:  Anterolateral  Location:  Knee  Laterality:  Bilateral  Site:  Bilateral knee  Medications (Right):  16.8 mg sodium hyaluronate 16.8 mg/2 mL  Medications (Left):  16.8 mg sodium hyaluronate 16.8 mg/2 mL  Patient tolerance:  Patient tolerated the procedure well with no immediate complications     Dr. Orozco

## 2023-09-22 ENCOUNTER — APPOINTMENT (OUTPATIENT)
Dept: OBGYN | Facility: CLINIC | Age: 38
End: 2023-09-22
Payer: COMMERCIAL

## 2023-09-22 PROCEDURE — 0503F POSTPARTUM CARE VISIT: CPT

## 2023-10-03 ENCOUNTER — OUTPATIENT (OUTPATIENT)
Dept: OUTPATIENT SERVICES | Facility: HOSPITAL | Age: 38
LOS: 1 days | End: 2023-10-03
Payer: COMMERCIAL

## 2023-10-03 ENCOUNTER — APPOINTMENT (OUTPATIENT)
Dept: BARIATRICS/WEIGHT MGMT | Facility: CLINIC | Age: 38
End: 2023-10-03
Payer: COMMERCIAL

## 2023-10-03 VITALS — BODY MASS INDEX: 44.93 KG/M2 | WEIGHT: 270 LBS

## 2023-10-03 DIAGNOSIS — F32.A DEPRESSION, UNSPECIFIED: ICD-10-CM

## 2023-10-03 DIAGNOSIS — Z98.890 OTHER SPECIFIED POSTPROCEDURAL STATES: Chronic | ICD-10-CM

## 2023-10-03 DIAGNOSIS — I10 ESSENTIAL (PRIMARY) HYPERTENSION: ICD-10-CM

## 2023-10-03 DIAGNOSIS — Z98.891 HISTORY OF UTERINE SCAR FROM PREVIOUS SURGERY: Chronic | ICD-10-CM

## 2023-10-03 DIAGNOSIS — E66.01 MORBID (SEVERE) OBESITY DUE TO EXCESS CALORIES: ICD-10-CM

## 2023-10-03 DIAGNOSIS — Z87.39 PERSONAL HISTORY OF OTHER DISEASES OF THE MUSCULOSKELETAL SYSTEM AND CONNECTIVE TISSUE: Chronic | ICD-10-CM

## 2023-10-03 DIAGNOSIS — E16.1 OTHER HYPOGLYCEMIA: ICD-10-CM

## 2023-10-03 PROCEDURE — 99214 OFFICE O/P EST MOD 30 MIN: CPT | Mod: 95

## 2023-10-03 PROCEDURE — G0463: CPT

## 2023-10-03 RX ORDER — PEN NEEDLE, DIABETIC 29 G X1/2"
32G X 4 MM NEEDLE, DISPOSABLE MISCELLANEOUS
Qty: 90 | Refills: 1 | Status: DISCONTINUED | COMMUNITY
Start: 2023-09-01 | End: 2023-10-03

## 2023-10-03 RX ORDER — LIRAGLUTIDE 6 MG/ML
18 INJECTION, SOLUTION SUBCUTANEOUS DAILY
Qty: 4 | Refills: 0 | Status: DISCONTINUED | COMMUNITY
Start: 2023-09-01 | End: 2023-10-03

## 2023-11-06 ENCOUNTER — APPOINTMENT (OUTPATIENT)
Dept: BARIATRICS/WEIGHT MGMT | Facility: CLINIC | Age: 38
End: 2023-11-06
Payer: COMMERCIAL

## 2023-11-06 ENCOUNTER — OUTPATIENT (OUTPATIENT)
Dept: OUTPATIENT SERVICES | Facility: HOSPITAL | Age: 38
LOS: 1 days | End: 2023-11-06
Payer: COMMERCIAL

## 2023-11-06 VITALS — BODY MASS INDEX: 48.43 KG/M2 | WEIGHT: 291 LBS

## 2023-11-06 DIAGNOSIS — Z98.891 HISTORY OF UTERINE SCAR FROM PREVIOUS SURGERY: Chronic | ICD-10-CM

## 2023-11-06 DIAGNOSIS — E66.01 MORBID (SEVERE) OBESITY DUE TO EXCESS CALORIES: ICD-10-CM

## 2023-11-06 DIAGNOSIS — E16.1 OTHER HYPOGLYCEMIA: ICD-10-CM

## 2023-11-06 DIAGNOSIS — Z87.39 PERSONAL HISTORY OF OTHER DISEASES OF THE MUSCULOSKELETAL SYSTEM AND CONNECTIVE TISSUE: Chronic | ICD-10-CM

## 2023-11-06 DIAGNOSIS — Z98.890 OTHER SPECIFIED POSTPROCEDURAL STATES: Chronic | ICD-10-CM

## 2023-11-06 PROCEDURE — G0463: CPT

## 2023-11-06 PROCEDURE — 99214 OFFICE O/P EST MOD 30 MIN: CPT | Mod: 95

## 2023-11-07 DIAGNOSIS — I10 ESSENTIAL (PRIMARY) HYPERTENSION: ICD-10-CM

## 2023-12-05 ENCOUNTER — OUTPATIENT (OUTPATIENT)
Dept: OUTPATIENT SERVICES | Facility: HOSPITAL | Age: 38
LOS: 1 days | End: 2023-12-05
Payer: COMMERCIAL

## 2023-12-05 ENCOUNTER — APPOINTMENT (OUTPATIENT)
Dept: BARIATRICS/WEIGHT MGMT | Facility: CLINIC | Age: 38
End: 2023-12-05
Payer: COMMERCIAL

## 2023-12-05 VITALS — BODY MASS INDEX: 48.09 KG/M2 | WEIGHT: 289 LBS

## 2023-12-05 DIAGNOSIS — Z98.890 OTHER SPECIFIED POSTPROCEDURAL STATES: Chronic | ICD-10-CM

## 2023-12-05 DIAGNOSIS — Z87.39 PERSONAL HISTORY OF OTHER DISEASES OF THE MUSCULOSKELETAL SYSTEM AND CONNECTIVE TISSUE: Chronic | ICD-10-CM

## 2023-12-05 DIAGNOSIS — I10 ESSENTIAL (PRIMARY) HYPERTENSION: ICD-10-CM

## 2023-12-05 DIAGNOSIS — Z98.891 HISTORY OF UTERINE SCAR FROM PREVIOUS SURGERY: Chronic | ICD-10-CM

## 2023-12-05 PROCEDURE — 99214 OFFICE O/P EST MOD 30 MIN: CPT | Mod: 95

## 2023-12-05 PROCEDURE — G0463: CPT

## 2023-12-14 DIAGNOSIS — E16.1 OTHER HYPOGLYCEMIA: ICD-10-CM

## 2023-12-14 DIAGNOSIS — E66.01 MORBID (SEVERE) OBESITY DUE TO EXCESS CALORIES: ICD-10-CM

## 2024-01-10 ENCOUNTER — APPOINTMENT (OUTPATIENT)
Dept: OBGYN | Facility: CLINIC | Age: 39
End: 2024-01-10
Payer: COMMERCIAL

## 2024-01-10 PROCEDURE — 99213 OFFICE O/P EST LOW 20 MIN: CPT

## 2024-01-16 ENCOUNTER — APPOINTMENT (OUTPATIENT)
Dept: BARIATRICS/WEIGHT MGMT | Facility: CLINIC | Age: 39
End: 2024-01-16
Payer: COMMERCIAL

## 2024-01-16 VITALS — HEIGHT: 65 IN | WEIGHT: 273 LBS | BODY MASS INDEX: 45.48 KG/M2

## 2024-01-16 PROCEDURE — 99214 OFFICE O/P EST MOD 30 MIN: CPT | Mod: 95

## 2024-01-16 RX ORDER — BUPROPION HYDROCHLORIDE 150 MG/1
150 TABLET, EXTENDED RELEASE ORAL DAILY
Qty: 180 | Refills: 0 | Status: DISCONTINUED | COMMUNITY
Start: 2023-11-06 | End: 2024-01-16

## 2024-01-16 RX ORDER — PHENTERMINE HYDROCHLORIDE 37.5 MG/1
37.5 TABLET ORAL
Qty: 30 | Refills: 0 | Status: DISCONTINUED | COMMUNITY
Start: 2023-10-03 | End: 2024-01-16

## 2024-01-16 NOTE — HISTORY OF PRESENT ILLNESS
[FreeTextEntry1] : This is a 38 year old female  being seen obesity followup visit.   She is taking zepbound 5mg, paying out of pocket but feels this medication is the best as she does not have poor GI   side effects  She is getting less appetite control on current dose, and more "food noise" but learning to manage that  Patient states saxenda is covered but requires PA and letter of necessity , and will transition to that when she hits her goal weight for maintenance   has a walking pad, walks 45-60min most days Has a group of mom friends who she will walk with her baby   has amilcar, wears CPAP but not regularly. finds it difficult to fall asleep with it on Very poor sleep as she is up with her infant multiple times a night

## 2024-01-16 NOTE — ASSESSMENT
[FreeTextEntry1] : Bariatric surgery history: none Obesity co - morbidities: depression, FISH, pcos  Comorbidities improved or resolved: none Anti-obesity medications: metformin, mounjaro  Obesity medication side effects: none  Plan:   Increase zepbound to 7.5mg. can increase to 10mg after 3-4 weeks as this was the dose she was on prior to pregnancy. Sent both doses in to pharmacy will work on saxenda PA.  patient is unable to tolerate wellbutrin and phentermine as she had intolerable side effects to both- glp-1 is the best option    keep food journal  track steps- continue using walking pad recommended she use CPAP machine every time she is sleeping   f/u 4 weeks

## 2024-02-20 ENCOUNTER — APPOINTMENT (OUTPATIENT)
Dept: BARIATRICS/WEIGHT MGMT | Facility: CLINIC | Age: 39
End: 2024-02-20
Payer: COMMERCIAL

## 2024-02-20 VITALS — HEIGHT: 65 IN | WEIGHT: 265 LBS | BODY MASS INDEX: 44.15 KG/M2

## 2024-02-20 PROCEDURE — 99213 OFFICE O/P EST LOW 20 MIN: CPT | Mod: 95

## 2024-02-20 RX ORDER — TIRZEPATIDE 7.5 MG/.5ML
7.5 INJECTION, SOLUTION SUBCUTANEOUS
Qty: 4 | Refills: 5 | Status: DISCONTINUED | COMMUNITY
Start: 2024-01-16 | End: 2024-02-20

## 2024-02-20 RX ORDER — METFORMIN HYDROCHLORIDE 500 MG/1
500 TABLET, COATED ORAL
Qty: 360 | Refills: 1 | Status: ACTIVE | COMMUNITY
Start: 2023-09-01 | End: 1900-01-01

## 2024-02-20 RX ORDER — TIRZEPATIDE 5 MG/.5ML
5 INJECTION, SOLUTION SUBCUTANEOUS WEEKLY
Qty: 4 | Refills: 1 | Status: DISCONTINUED | COMMUNITY
Start: 2023-12-05 | End: 2024-02-20

## 2024-02-20 NOTE — REASON FOR VISIT
[Follow-Up] : a follow-up visit [Home] : at home, [unfilled] , at the time of the visit. [Other Location: e.g. Home (Enter Location, City,State)___] : at [unfilled] [Patient] : the patient [Self] : self

## 2024-02-20 NOTE — HISTORY OF PRESENT ILLNESS
[FreeTextEntry1] : This is a 38 year old female  being seen obesity followup visit.   She is taking zepbound 10mg, slightly fatigued the day after injection  Tolerating better then 7.5mg- no nausea, no sulfur burps  Eating 3 meals a day  B: eggs and avocado  L: protein and veg D: carb, protein and veg  has a walking pad and doing 30 min low impact dance classes at home   has amilcar, wears CPAP but not regularly. finds it difficult to fall asleep with it on Very poor sleep as she is up with her infant multiple times a night

## 2024-02-20 NOTE — ASSESSMENT
[FreeTextEntry1] : Bariatric surgery history: none Obesity co - morbidities: depression, FISH, pcos  Comorbidities improved or resolved: none Anti-obesity medications: metformin, mounjaro  Obesity medication side effects: none  Plan:   contnue zepbound 10mg - patient will decide when she will stop paying out of pocket then plan is to transition back to saxenda  will reorder saxenda 3mg patient is unable to tolerate wellbutrin and phentermine glp-1 is the best option  keep food journal  track steps- continue using walking pad   f/u 4 weeks

## 2024-03-04 ENCOUNTER — APPOINTMENT (OUTPATIENT)
Dept: OBGYN | Facility: CLINIC | Age: 39
End: 2024-03-04
Payer: COMMERCIAL

## 2024-03-04 PROCEDURE — 99213 OFFICE O/P EST LOW 20 MIN: CPT

## 2024-03-14 ENCOUNTER — EMERGENCY (EMERGENCY)
Facility: HOSPITAL | Age: 39
LOS: 1 days | Discharge: HOME | End: 2024-03-14
Attending: STUDENT IN AN ORGANIZED HEALTH CARE EDUCATION/TRAINING PROGRAM | Admitting: STUDENT IN AN ORGANIZED HEALTH CARE EDUCATION/TRAINING PROGRAM
Payer: COMMERCIAL

## 2024-03-14 VITALS
RESPIRATION RATE: 18 BRPM | OXYGEN SATURATION: 100 % | HEART RATE: 73 BPM | DIASTOLIC BLOOD PRESSURE: 81 MMHG | TEMPERATURE: 98 F | SYSTOLIC BLOOD PRESSURE: 154 MMHG

## 2024-03-14 DIAGNOSIS — Z87.39 PERSONAL HISTORY OF OTHER DISEASES OF THE MUSCULOSKELETAL SYSTEM AND CONNECTIVE TISSUE: Chronic | ICD-10-CM

## 2024-03-14 DIAGNOSIS — Z98.890 OTHER SPECIFIED POSTPROCEDURAL STATES: Chronic | ICD-10-CM

## 2024-03-14 DIAGNOSIS — Z98.891 HISTORY OF UTERINE SCAR FROM PREVIOUS SURGERY: Chronic | ICD-10-CM

## 2024-03-14 PROCEDURE — 99285 EMERGENCY DEPT VISIT HI MDM: CPT

## 2024-03-14 NOTE — ED ADULT TRIAGE NOTE - CHIEF COMPLAINT QUOTE
c/o sudden severe lower left abdominal pain with vomiting starting tonight.  Last BM 20 minuites ago. Denies urinary symptom. Pt appears uncomfortably, hyperventilating, unable to sit still. Denies pmhx.

## 2024-03-15 VITALS
TEMPERATURE: 98 F | DIASTOLIC BLOOD PRESSURE: 95 MMHG | OXYGEN SATURATION: 100 % | HEART RATE: 75 BPM | SYSTOLIC BLOOD PRESSURE: 140 MMHG | RESPIRATION RATE: 17 BRPM

## 2024-03-15 LAB
ALBUMIN SERPL ELPH-MCNC: 4.6 G/DL — SIGNIFICANT CHANGE UP (ref 3.3–5)
ALP SERPL-CCNC: 75 U/L — SIGNIFICANT CHANGE UP (ref 40–120)
ALT FLD-CCNC: 128 U/L — HIGH (ref 4–33)
ANION GAP SERPL CALC-SCNC: 17 MMOL/L — HIGH (ref 7–14)
APPEARANCE UR: CLEAR — SIGNIFICANT CHANGE UP
AST SERPL-CCNC: 42 U/L — HIGH (ref 4–32)
BACTERIA # UR AUTO: ABNORMAL /HPF
BASE EXCESS BLDV CALC-SCNC: -2.5 MMOL/L — LOW (ref -2–3)
BASOPHILS # BLD AUTO: 0.07 K/UL — SIGNIFICANT CHANGE UP (ref 0–0.2)
BASOPHILS NFR BLD AUTO: 0.6 % — SIGNIFICANT CHANGE UP (ref 0–2)
BILIRUB SERPL-MCNC: 0.4 MG/DL — SIGNIFICANT CHANGE UP (ref 0.2–1.2)
BILIRUB UR-MCNC: NEGATIVE — SIGNIFICANT CHANGE UP
BLOOD GAS VENOUS COMPREHENSIVE RESULT: SIGNIFICANT CHANGE UP
BUN SERPL-MCNC: 14 MG/DL — SIGNIFICANT CHANGE UP (ref 7–23)
CALCIUM SERPL-MCNC: 9.5 MG/DL — SIGNIFICANT CHANGE UP (ref 8.4–10.5)
CAST: 3 /LPF — SIGNIFICANT CHANGE UP (ref 0–4)
CHLORIDE BLDV-SCNC: 104 MMOL/L — SIGNIFICANT CHANGE UP (ref 96–108)
CHLORIDE SERPL-SCNC: 101 MMOL/L — SIGNIFICANT CHANGE UP (ref 98–107)
CO2 BLDV-SCNC: 22.8 MMOL/L — SIGNIFICANT CHANGE UP (ref 22–26)
CO2 SERPL-SCNC: 18 MMOL/L — LOW (ref 22–31)
COLOR SPEC: YELLOW — SIGNIFICANT CHANGE UP
CREAT SERPL-MCNC: 0.88 MG/DL — SIGNIFICANT CHANGE UP (ref 0.5–1.3)
DIFF PNL FLD: ABNORMAL
EGFR: 87 ML/MIN/1.73M2 — SIGNIFICANT CHANGE UP
EOSINOPHIL # BLD AUTO: 0.1 K/UL — SIGNIFICANT CHANGE UP (ref 0–0.5)
EOSINOPHIL NFR BLD AUTO: 0.8 % — SIGNIFICANT CHANGE UP (ref 0–6)
GAS PNL BLDV: 135 MMOL/L — LOW (ref 136–145)
GLUCOSE BLDV-MCNC: 108 MG/DL — HIGH (ref 70–99)
GLUCOSE SERPL-MCNC: 115 MG/DL — HIGH (ref 70–99)
GLUCOSE UR QL: NEGATIVE MG/DL — SIGNIFICANT CHANGE UP
HCG SERPL-ACNC: <1 MIU/ML — SIGNIFICANT CHANGE UP
HCO3 BLDV-SCNC: 22 MMOL/L — SIGNIFICANT CHANGE UP (ref 22–29)
HCT VFR BLD CALC: 38.6 % — SIGNIFICANT CHANGE UP (ref 34.5–45)
HCT VFR BLDA CALC: 41 % — SIGNIFICANT CHANGE UP (ref 34.5–46.5)
HGB BLD CALC-MCNC: 13.7 G/DL — SIGNIFICANT CHANGE UP (ref 11.7–16.1)
HGB BLD-MCNC: 13.3 G/DL — SIGNIFICANT CHANGE UP (ref 11.5–15.5)
IANC: 9.29 K/UL — HIGH (ref 1.8–7.4)
IMM GRANULOCYTES NFR BLD AUTO: 0.5 % — SIGNIFICANT CHANGE UP (ref 0–0.9)
KETONES UR-MCNC: ABNORMAL MG/DL
LACTATE BLDV-MCNC: 1.9 MMOL/L — SIGNIFICANT CHANGE UP (ref 0.5–2)
LEUKOCYTE ESTERASE UR-ACNC: NEGATIVE — SIGNIFICANT CHANGE UP
LIDOCAIN IGE QN: 23 U/L — SIGNIFICANT CHANGE UP (ref 7–60)
LYMPHOCYTES # BLD AUTO: 1.8 K/UL — SIGNIFICANT CHANGE UP (ref 1–3.3)
LYMPHOCYTES # BLD AUTO: 14.9 % — SIGNIFICANT CHANGE UP (ref 13–44)
MCHC RBC-ENTMCNC: 30.1 PG — SIGNIFICANT CHANGE UP (ref 27–34)
MCHC RBC-ENTMCNC: 34.5 GM/DL — SIGNIFICANT CHANGE UP (ref 32–36)
MCV RBC AUTO: 87.3 FL — SIGNIFICANT CHANGE UP (ref 80–100)
MONOCYTES # BLD AUTO: 0.74 K/UL — SIGNIFICANT CHANGE UP (ref 0–0.9)
MONOCYTES NFR BLD AUTO: 6.1 % — SIGNIFICANT CHANGE UP (ref 2–14)
NEUTROPHILS # BLD AUTO: 9.29 K/UL — HIGH (ref 1.8–7.4)
NEUTROPHILS NFR BLD AUTO: 77.1 % — HIGH (ref 43–77)
NITRITE UR-MCNC: NEGATIVE — SIGNIFICANT CHANGE UP
NRBC # BLD: 0 /100 WBCS — SIGNIFICANT CHANGE UP (ref 0–0)
NRBC # FLD: 0 K/UL — SIGNIFICANT CHANGE UP (ref 0–0)
PCO2 BLDV: 35 MMHG — LOW (ref 39–52)
PH BLDV: 7.4 — SIGNIFICANT CHANGE UP (ref 7.32–7.43)
PH UR: 6 — SIGNIFICANT CHANGE UP (ref 5–8)
PLATELET # BLD AUTO: 274 K/UL — SIGNIFICANT CHANGE UP (ref 150–400)
PO2 BLDV: 90 MMHG — HIGH (ref 25–45)
POTASSIUM BLDV-SCNC: 3 MMOL/L — LOW (ref 3.5–5.1)
POTASSIUM SERPL-MCNC: 3.4 MMOL/L — LOW (ref 3.5–5.3)
POTASSIUM SERPL-SCNC: 3.4 MMOL/L — LOW (ref 3.5–5.3)
PROT SERPL-MCNC: 7.9 G/DL — SIGNIFICANT CHANGE UP (ref 6–8.3)
PROT UR-MCNC: 30 MG/DL
RBC # BLD: 4.42 M/UL — SIGNIFICANT CHANGE UP (ref 3.8–5.2)
RBC # FLD: 11.9 % — SIGNIFICANT CHANGE UP (ref 10.3–14.5)
RBC CASTS # UR COMP ASSIST: 54 /HPF — HIGH (ref 0–4)
SAO2 % BLDV: 97.8 % — HIGH (ref 67–88)
SODIUM SERPL-SCNC: 136 MMOL/L — SIGNIFICANT CHANGE UP (ref 135–145)
SP GR SPEC: 1.06 — HIGH (ref 1–1.03)
SQUAMOUS # UR AUTO: 5 /HPF — SIGNIFICANT CHANGE UP (ref 0–5)
UROBILINOGEN FLD QL: 1 MG/DL — SIGNIFICANT CHANGE UP (ref 0.2–1)
WBC # BLD: 12.06 K/UL — HIGH (ref 3.8–10.5)
WBC # FLD AUTO: 12.06 K/UL — HIGH (ref 3.8–10.5)
WBC UR QL: 1 /HPF — SIGNIFICANT CHANGE UP (ref 0–5)

## 2024-03-15 PROCEDURE — 74177 CT ABD & PELVIS W/CONTRAST: CPT | Mod: 26,MC

## 2024-03-15 PROCEDURE — 76830 TRANSVAGINAL US NON-OB: CPT | Mod: 26

## 2024-03-15 RX ORDER — MORPHINE SULFATE 50 MG/1
4 CAPSULE, EXTENDED RELEASE ORAL ONCE
Refills: 0 | Status: DISCONTINUED | OUTPATIENT
Start: 2024-03-15 | End: 2024-03-15

## 2024-03-15 RX ORDER — OXYCODONE HYDROCHLORIDE 5 MG/1
1 TABLET ORAL
Qty: 9 | Refills: 0
Start: 2024-03-15 | End: 2024-03-17

## 2024-03-15 RX ORDER — ONDANSETRON 8 MG/1
4 TABLET, FILM COATED ORAL ONCE
Refills: 0 | Status: COMPLETED | OUTPATIENT
Start: 2024-03-15 | End: 2024-03-15

## 2024-03-15 RX ORDER — KETOROLAC TROMETHAMINE 30 MG/ML
15 SYRINGE (ML) INJECTION ONCE
Refills: 0 | Status: DISCONTINUED | OUTPATIENT
Start: 2024-03-15 | End: 2024-03-15

## 2024-03-15 RX ORDER — POTASSIUM CHLORIDE 20 MEQ
40 PACKET (EA) ORAL ONCE
Refills: 0 | Status: COMPLETED | OUTPATIENT
Start: 2024-03-15 | End: 2024-03-15

## 2024-03-15 RX ORDER — SODIUM CHLORIDE 9 MG/ML
1000 INJECTION INTRAMUSCULAR; INTRAVENOUS; SUBCUTANEOUS ONCE
Refills: 0 | Status: COMPLETED | OUTPATIENT
Start: 2024-03-15 | End: 2024-03-15

## 2024-03-15 RX ORDER — ACETAMINOPHEN 500 MG
1000 TABLET ORAL ONCE
Refills: 0 | Status: COMPLETED | OUTPATIENT
Start: 2024-03-15 | End: 2024-03-15

## 2024-03-15 RX ORDER — ONDANSETRON 8 MG/1
1 TABLET, FILM COATED ORAL
Qty: 15 | Refills: 0
Start: 2024-03-15 | End: 2024-03-19

## 2024-03-15 RX ADMIN — Medication 400 MILLIGRAM(S): at 02:26

## 2024-03-15 RX ADMIN — MORPHINE SULFATE 4 MILLIGRAM(S): 50 CAPSULE, EXTENDED RELEASE ORAL at 00:29

## 2024-03-15 RX ADMIN — SODIUM CHLORIDE 1000 MILLILITER(S): 9 INJECTION INTRAMUSCULAR; INTRAVENOUS; SUBCUTANEOUS at 00:29

## 2024-03-15 RX ADMIN — Medication 40 MILLIEQUIVALENT(S): at 03:56

## 2024-03-15 RX ADMIN — Medication 15 MILLIGRAM(S): at 02:29

## 2024-03-15 RX ADMIN — SODIUM CHLORIDE 1000 MILLILITER(S): 9 INJECTION INTRAMUSCULAR; INTRAVENOUS; SUBCUTANEOUS at 04:12

## 2024-03-15 RX ADMIN — ONDANSETRON 4 MILLIGRAM(S): 8 TABLET, FILM COATED ORAL at 02:26

## 2024-03-15 RX ADMIN — MORPHINE SULFATE 4 MILLIGRAM(S): 50 CAPSULE, EXTENDED RELEASE ORAL at 04:12

## 2024-03-15 RX ADMIN — Medication 15 MILLIGRAM(S): at 04:12

## 2024-03-15 NOTE — ED PROVIDER NOTE - NSFOLLOWUPINSTRUCTIONS_ED_ALL_ED_FT
You were seen in the Emergency Room today because of left sided pain, You have a kidney stone. A copy of your results is included in your discharge paperwork.     We have sent medication to your Pharmacy. It is called Oxycodone (for severe pain) and Odansetron (for nausea).   For mild to moderate pain, you can take Tylenol 1000mg and Ibuprofen 400mg together every 6 hours.     Please follow-up with your Primary Care Physician within the week.   We also recommend you following up with a Urologist for further management and workup. The contact information for Dr. Anaya is included in your discharge paperwork.     WHAT YOU NEED TO KNOW:    Kidney stones form in the urinary system when the water and waste in your urine are out of balance. When this happens, certain types of waste crystals separate from the urine. The crystals build up and form kidney stones. You may have more than one kidney stone.    DISCHARGE INSTRUCTIONS:    Seek care immediately if:   •You are vomiting and it is not relieved with medicine.    Call your primary care doctor if:   •You have a fever.  •You have trouble urinating.  •You see blood in your urine.  •You have severe pain.  •You have any questions or concerns about your condition or care.    What you can do to manage kidney stones:   •Drink more liquids. Your healthcare provider may tell you to drink at least 8 to 12 (eight-ounce) cups of liquids each day. This helps flush out the kidney stones when you urinate. Water is the best liquid to drink.  •Strain your urine every time you go to the bathroom. Urinate through a strainer or a piece of thin cloth to catch the stones. Take the stones to your healthcare provider so they can be sent to the lab for tests.   •Eat a variety of healthy foods. Healthy foods include fruits, vegetables, whole-grain breads, low-fat dairy products, beans, and fish. You may need to limit how much sodium (salt) or protein you eat.   •Be physically active as directed. Your stones may pass more easily if you stay active. Physical activity can also help you manage your weight.     After you pass the kidney stones: Your healthcare provider may order a 24-hour urine test. Results from a 24-hour urine test will help your healthcare provider plan ways to prevent more stones from forming. Your healthcare provider will give you more instructions.    Follow up with your doctor or kidney specialist as directed: Write down your questions so you remember to ask them during your visits.

## 2024-03-15 NOTE — ED PROVIDER NOTE - PHYSICAL EXAMINATION
Gen: significant distress, AOx3, able to make needs known, non-toxic  Head: NCAT  HEENT: EOMI, normal conjunctiva  Lung: no respiratory distress, speaking in full sentences  CV: pulses bilaterally   Abd: soft, tender in the left periumbilical area, no guarding, no CVA tenderness  MSK: no visible bony deformities  Neuro: No focal sensory or motor deficits  Skin: Warm, well perfused, no rash  Psych: pained affect

## 2024-03-15 NOTE — ED ADULT NURSE NOTE - OBJECTIVE STATEMENT
Break RN note- Patient arrives to the ED for lower left abdominal pain and vomiting starting suddenly tonight. Patient reports diarrhea at home but no blood in stool. Patient very uncomfortable, writhing in pain. 20g IV placed to right arm by primary RN. 20g IV placed to left hand. Labs sent as ordered. Patient medicated as ordered. Belly soft, nontender, nondistended. Patient aware we need a urine sample. Awaiting imaging. Safety maintained. Patient stable upon exiting the room.

## 2024-03-15 NOTE — ED ADULT NURSE REASSESSMENT NOTE - NS ED NURSE REASSESS COMMENT FT1
Report received from katelyn RNLinsey. Pt appears uncomfortable, c/o left lower quadrant abdominal pain and nausea with no vomiting. MD Zaldivar made aware and patient medicated as ordered, see MAR. HOB elevated. NSR on bedside cardiac monitor. Speaking in full and complete sentences. Family at bedside. Safety maintained. Awaiting CT and US results.
Pt verbalizes partial relief in LLQ abdominal pain. Appears more comfortable at this time. Reports 5/10 pain level at this time. NSR on bedside cardiac monitor. Respirations even and unlabored with equal chest rise. No acute distress noted. Speaking in full and complete sentences. Safety maintained. Awaiting further orders.

## 2024-03-15 NOTE — ED ADULT NURSE NOTE - NSFALLUNIVINTERV_ED_ALL_ED
Bed/Stretcher in lowest position, wheels locked, appropriate side rails in place/Call bell, personal items and telephone in reach/Instruct patient to call for assistance before getting out of bed/chair/stretcher/Non-slip footwear applied when patient is off stretcher/[place] to call system/Physically safe environment - no spills, clutter or unnecessary equipment/Purposeful proactive rounding/Room/bathroom lighting operational, light cord in reach

## 2024-03-15 NOTE — ED PROVIDER NOTE - OBJECTIVE STATEMENT
[age]-year-old woman with PMH C-sections, ovarian cyst, presenting due to sudden left abdominal pain and nausea and vomiting.  Tried taking Motrin at home with no significant relief of symptoms.  Has a family history of nephrolithiasis however has never had kidney stones herself.  Denies fevers, chills, chest pain, SOB, hematuria.

## 2024-03-15 NOTE — ED PROVIDER NOTE - ATTENDING CONTRIBUTION TO CARE
I have personally performed a face to face medical and diagnostic evaluation of the patient. I have discussed with and reviewed the Resident's note and agree with the History, ROS, Physical Exam and MDM unless otherwise indicated. A brief summary of my personal evaluation and impression can be found below.    38-year-old female, past medical history of ovarian cyst, presenting with chief complaint of left-sided abdominal pain that started acutely few hours ago.  Associated nausea vomiting.  States that she noticed hematuria about 1 week ago.  But denies any other urinary symptoms.  No fevers or chills.  History of C-sections, no history of any other abdominal or pelvic surgeries.  Last menstrual period was approximately 3-1/2 weeks ago, due for it again this  weekend.  On exam, no reproducible abdominal tenderness to palpation or CVA tenderness bilaterally.  Vital signs stable.  But patient uncomfortable appearing.  Symptoms could be secondary to nephrolithiasis versus possible ovarian pathology given history of cysts.  Plan for CT, ultrasound, labs including abdominal enzymes.  Reassess to dispo. Bharti Zaldivar, ED Attending

## 2024-03-15 NOTE — ED PROVIDER NOTE - CLINICAL SUMMARY MEDICAL DECISION MAKING FREE TEXT BOX
Esau, PGY3 - 38-year-old woman with no history of nephrolithiasis in the past, PSx C-sections otherwise all organs presents, presenting due to left periumbilical pain and tenderness on exam, unable to sit still, presented suddenly with nausea and vomiting.  History and exam consistent with nephrolithiasis versus diverticulitis. labs, CT, ua, reassess. No CVA tenderness on exam, pain does not appear to be radiating for the back, lower concern for pyelonephritis at this time. *The above represents an initial assessment/impression. Please refer to progress notes for potential changes in patient clinical course*

## 2024-03-15 NOTE — ED PROVIDER NOTE - PROGRESS NOTE DETAILS
Esau, PGY3 - Esau, PGY3 - CT shows an obstructing left UVJ stone.  Transvaginal ultrasound shows no signs of ovarian torsion.  After repeat medication patient feeling improved, will send oxycodone and zofran to pharmacy. Patient stable for discharge. Understands the Emergency Room work-up and discharge precautions. Will follow-up with urology Esau, PGY3 - CT shows an obstructing left UVJ stone.  Transvaginal ultrasound shows no signs of ovarian torsion. Patient was made aware of incidental lung nodule finding and need to follow-up with her PCP, aware and in agreement. After repeat medication patient feeling improved, will send oxycodone and zofran to pharmacy. Patient stable for discharge. Understands the Emergency Room work-up and discharge precautions. Will follow-up with urology

## 2024-03-15 NOTE — ED PROVIDER NOTE - CARE PROVIDER_API CALL
Harvey Anaya)  Urology  [address] [place] [address], [address] [address]  Phone: [phone]  Fax: [phone]  Follow Up Time:

## 2024-03-15 NOTE — ED PROVIDER NOTE - NS ED ROS FT
GENERAL: No fever, no chills  EYES: No change in vision  HEENT: No trouble swallowing or speaking  CARDIAC: No chest pain  PULMONARY: No cough, no SOB  GI: +abdominal pain, +N/V, no diarrhea, no constipation  : No changes in urination  SKIN: No rashes  NEURO: No headache, no numbness  MSK: No joint pain  Otherwise as HPI or negative.

## 2024-03-15 NOTE — ED PROVIDER NOTE - PATIENT PORTAL LINK FT
You can access the FollowMyHealth Patient Portal offered by [facility] by registering at the following website: [email or web address]. By joining [employer]’s FollowMyHealth portal, you will also be able to view your health information using other applications (apps) compatible with our system.

## 2024-03-16 ENCOUNTER — INPATIENT (INPATIENT)
Facility: HOSPITAL | Age: 39
LOS: 0 days | Discharge: HOME | DRG: 694 | End: 2024-03-17
Attending: UROLOGY | Admitting: UROLOGY
Payer: COMMERCIAL

## 2024-03-16 ENCOUNTER — TRANSCRIPTION ENCOUNTER (OUTPATIENT)
Age: 39
End: 2024-03-16

## 2024-03-16 VITALS
HEIGHT: 65 IN | HEART RATE: 91 BPM | DIASTOLIC BLOOD PRESSURE: 91 MMHG | WEIGHT: 255.07 LBS | RESPIRATION RATE: 24 BRPM | SYSTOLIC BLOOD PRESSURE: 117 MMHG | TEMPERATURE: 97 F | OXYGEN SATURATION: 100 %

## 2024-03-16 DIAGNOSIS — Z98.891 HISTORY OF UTERINE SCAR FROM PREVIOUS SURGERY: Chronic | ICD-10-CM

## 2024-03-16 DIAGNOSIS — Z98.890 OTHER SPECIFIED POSTPROCEDURAL STATES: Chronic | ICD-10-CM

## 2024-03-16 DIAGNOSIS — Z87.39 PERSONAL HISTORY OF OTHER DISEASES OF THE MUSCULOSKELETAL SYSTEM AND CONNECTIVE TISSUE: Chronic | ICD-10-CM

## 2024-03-16 LAB
ALBUMIN SERPL ELPH-MCNC: 4.5 G/DL — SIGNIFICANT CHANGE UP (ref 3.3–5)
ALP SERPL-CCNC: 74 U/L — SIGNIFICANT CHANGE UP (ref 40–120)
ALT FLD-CCNC: 96 U/L — HIGH (ref 10–45)
ANION GAP SERPL CALC-SCNC: 18 MMOL/L — HIGH (ref 5–17)
APPEARANCE UR: CLEAR — SIGNIFICANT CHANGE UP
APTT BLD: 29.8 SEC — SIGNIFICANT CHANGE UP (ref 24.5–35.6)
AST SERPL-CCNC: 29 U/L — SIGNIFICANT CHANGE UP (ref 10–40)
BACTERIA # UR AUTO: NEGATIVE /HPF — SIGNIFICANT CHANGE UP
BASE EXCESS BLDV CALC-SCNC: 1 MMOL/L — SIGNIFICANT CHANGE UP (ref -2–3)
BASOPHILS # BLD AUTO: 0.09 K/UL — SIGNIFICANT CHANGE UP (ref 0–0.2)
BASOPHILS NFR BLD AUTO: 0.7 % — SIGNIFICANT CHANGE UP (ref 0–2)
BILIRUB SERPL-MCNC: 0.4 MG/DL — SIGNIFICANT CHANGE UP (ref 0.2–1.2)
BILIRUB UR-MCNC: NEGATIVE — SIGNIFICANT CHANGE UP
BLD GP AB SCN SERPL QL: NEGATIVE — SIGNIFICANT CHANGE UP
BUN SERPL-MCNC: 12 MG/DL — SIGNIFICANT CHANGE UP (ref 7–23)
CA-I SERPL-SCNC: 1.14 MMOL/L — LOW (ref 1.15–1.33)
CALCIUM SERPL-MCNC: 9.5 MG/DL — SIGNIFICANT CHANGE UP (ref 8.4–10.5)
CAST: 0 /LPF — SIGNIFICANT CHANGE UP (ref 0–4)
CHLORIDE BLDV-SCNC: 105 MMOL/L — SIGNIFICANT CHANGE UP (ref 96–108)
CHLORIDE SERPL-SCNC: 104 MMOL/L — SIGNIFICANT CHANGE UP (ref 96–108)
CO2 BLDV-SCNC: 27 MMOL/L — HIGH (ref 22–26)
CO2 SERPL-SCNC: 18 MMOL/L — LOW (ref 22–31)
COLOR SPEC: YELLOW — SIGNIFICANT CHANGE UP
CREAT SERPL-MCNC: 1.06 MG/DL — SIGNIFICANT CHANGE UP (ref 0.5–1.3)
CULTURE RESULTS: SIGNIFICANT CHANGE UP
DIFF PNL FLD: ABNORMAL
EGFR: 70 ML/MIN/1.73M2 — SIGNIFICANT CHANGE UP
EOSINOPHIL # BLD AUTO: 0.01 K/UL — SIGNIFICANT CHANGE UP (ref 0–0.5)
EOSINOPHIL NFR BLD AUTO: 0.1 % — SIGNIFICANT CHANGE UP (ref 0–6)
GAS PNL BLDV: 133 MMOL/L — LOW (ref 136–145)
GAS PNL BLDV: SIGNIFICANT CHANGE UP
GLUCOSE BLDV-MCNC: 85 MG/DL — SIGNIFICANT CHANGE UP (ref 70–99)
GLUCOSE SERPL-MCNC: 105 MG/DL — HIGH (ref 70–99)
GLUCOSE UR QL: NEGATIVE MG/DL — SIGNIFICANT CHANGE UP
HCO3 BLDV-SCNC: 26 MMOL/L — SIGNIFICANT CHANGE UP (ref 22–29)
HCT VFR BLD CALC: 39 % — SIGNIFICANT CHANGE UP (ref 34.5–45)
HCT VFR BLDA CALC: 40 % — SIGNIFICANT CHANGE UP (ref 34.5–46.5)
HGB BLD CALC-MCNC: 13.3 G/DL — SIGNIFICANT CHANGE UP (ref 11.7–16.1)
HGB BLD-MCNC: 13.4 G/DL — SIGNIFICANT CHANGE UP (ref 11.5–15.5)
IMM GRANULOCYTES NFR BLD AUTO: 0.2 % — SIGNIFICANT CHANGE UP (ref 0–0.9)
INR BLD: 1.1 RATIO — SIGNIFICANT CHANGE UP (ref 0.85–1.18)
KETONES UR-MCNC: ABNORMAL MG/DL
LACTATE BLDV-MCNC: 1.8 MMOL/L — SIGNIFICANT CHANGE UP (ref 0.5–2)
LEUKOCYTE ESTERASE UR-ACNC: NEGATIVE — SIGNIFICANT CHANGE UP
LYMPHOCYTES # BLD AUTO: 1.16 K/UL — SIGNIFICANT CHANGE UP (ref 1–3.3)
LYMPHOCYTES # BLD AUTO: 8.8 % — LOW (ref 13–44)
MCHC RBC-ENTMCNC: 29.6 PG — SIGNIFICANT CHANGE UP (ref 27–34)
MCHC RBC-ENTMCNC: 34.4 GM/DL — SIGNIFICANT CHANGE UP (ref 32–36)
MCV RBC AUTO: 86.3 FL — SIGNIFICANT CHANGE UP (ref 80–100)
MONOCYTES # BLD AUTO: 0.58 K/UL — SIGNIFICANT CHANGE UP (ref 0–0.9)
MONOCYTES NFR BLD AUTO: 4.4 % — SIGNIFICANT CHANGE UP (ref 2–14)
NEUTROPHILS # BLD AUTO: 11.27 K/UL — HIGH (ref 1.8–7.4)
NEUTROPHILS NFR BLD AUTO: 85.8 % — HIGH (ref 43–77)
NITRITE UR-MCNC: NEGATIVE — SIGNIFICANT CHANGE UP
NRBC # BLD: 0 /100 WBCS — SIGNIFICANT CHANGE UP (ref 0–0)
PCO2 BLDV: 42 MMHG — SIGNIFICANT CHANGE UP (ref 39–42)
PH BLDV: 7.4 — SIGNIFICANT CHANGE UP (ref 7.32–7.43)
PH UR: 7 — SIGNIFICANT CHANGE UP (ref 5–8)
PLATELET # BLD AUTO: 287 K/UL — SIGNIFICANT CHANGE UP (ref 150–400)
PO2 BLDV: 52 MMHG — HIGH (ref 25–45)
POTASSIUM BLDV-SCNC: 4.2 MMOL/L — SIGNIFICANT CHANGE UP (ref 3.5–5.1)
POTASSIUM SERPL-MCNC: 3.8 MMOL/L — SIGNIFICANT CHANGE UP (ref 3.5–5.3)
POTASSIUM SERPL-SCNC: 3.8 MMOL/L — SIGNIFICANT CHANGE UP (ref 3.5–5.3)
PROT SERPL-MCNC: 7.8 G/DL — SIGNIFICANT CHANGE UP (ref 6–8.3)
PROT UR-MCNC: NEGATIVE MG/DL — SIGNIFICANT CHANGE UP
PROTHROM AB SERPL-ACNC: 11.5 SEC — SIGNIFICANT CHANGE UP (ref 9.5–13)
RBC # BLD: 4.52 M/UL — SIGNIFICANT CHANGE UP (ref 3.8–5.2)
RBC # FLD: 12.3 % — SIGNIFICANT CHANGE UP (ref 10.3–14.5)
RBC CASTS # UR COMP ASSIST: 89 /HPF — HIGH (ref 0–4)
RH IG SCN BLD-IMP: POSITIVE — SIGNIFICANT CHANGE UP
SAO2 % BLDV: 79.2 % — SIGNIFICANT CHANGE UP (ref 67–88)
SODIUM SERPL-SCNC: 140 MMOL/L — SIGNIFICANT CHANGE UP (ref 135–145)
SP GR SPEC: 1.01 — SIGNIFICANT CHANGE UP (ref 1–1.03)
SPECIMEN SOURCE: SIGNIFICANT CHANGE UP
SQUAMOUS # UR AUTO: 3 /HPF — SIGNIFICANT CHANGE UP (ref 0–5)
UROBILINOGEN FLD QL: 1 MG/DL — SIGNIFICANT CHANGE UP (ref 0.2–1)
WBC # BLD: 13.14 K/UL — HIGH (ref 3.8–10.5)
WBC # FLD AUTO: 13.14 K/UL — HIGH (ref 3.8–10.5)
WBC UR QL: 1 /HPF — SIGNIFICANT CHANGE UP (ref 0–5)

## 2024-03-16 PROCEDURE — 76770 US EXAM ABDO BACK WALL COMP: CPT | Mod: 26

## 2024-03-16 PROCEDURE — 99223 1ST HOSP IP/OBS HIGH 75: CPT

## 2024-03-16 RX ORDER — IBUPROFEN 200 MG
400 TABLET ORAL ONCE
Refills: 0 | Status: COMPLETED | OUTPATIENT
Start: 2024-03-16 | End: 2024-03-16

## 2024-03-16 RX ORDER — VENLAFAXINE HCL 75 MG
150 CAPSULE, EXT RELEASE 24 HR ORAL ONCE
Refills: 0 | Status: COMPLETED | OUTPATIENT
Start: 2024-03-16 | End: 2024-03-17

## 2024-03-16 RX ORDER — MORPHINE SULFATE 50 MG/1
4 CAPSULE, EXTENDED RELEASE ORAL ONCE
Refills: 0 | Status: DISCONTINUED | OUTPATIENT
Start: 2024-03-16 | End: 2024-03-16

## 2024-03-16 RX ORDER — ACETAMINOPHEN 500 MG
1000 TABLET ORAL ONCE
Refills: 0 | Status: COMPLETED | OUTPATIENT
Start: 2024-03-16 | End: 2024-03-16

## 2024-03-16 RX ORDER — SODIUM CHLORIDE 9 MG/ML
1000 INJECTION, SOLUTION INTRAVENOUS ONCE
Refills: 0 | Status: COMPLETED | OUTPATIENT
Start: 2024-03-16 | End: 2024-03-16

## 2024-03-16 RX ORDER — ONDANSETRON 8 MG/1
4 TABLET, FILM COATED ORAL ONCE
Refills: 0 | Status: COMPLETED | OUTPATIENT
Start: 2024-03-16 | End: 2024-03-16

## 2024-03-16 RX ORDER — SODIUM CHLORIDE 9 MG/ML
1000 INJECTION INTRAMUSCULAR; INTRAVENOUS; SUBCUTANEOUS
Refills: 0 | Status: DISCONTINUED | OUTPATIENT
Start: 2024-03-16 | End: 2024-03-17

## 2024-03-16 RX ORDER — KETOROLAC TROMETHAMINE 30 MG/ML
15 SYRINGE (ML) INJECTION ONCE
Refills: 0 | Status: DISCONTINUED | OUTPATIENT
Start: 2024-03-16 | End: 2024-03-16

## 2024-03-16 RX ORDER — TAMSULOSIN HYDROCHLORIDE 0.4 MG/1
0.4 CAPSULE ORAL ONCE
Refills: 0 | Status: COMPLETED | OUTPATIENT
Start: 2024-03-16 | End: 2024-03-16

## 2024-03-16 RX ORDER — HYDROMORPHONE HYDROCHLORIDE 2 MG/ML
0.5 INJECTION INTRAMUSCULAR; INTRAVENOUS; SUBCUTANEOUS ONCE
Refills: 0 | Status: DISCONTINUED | OUTPATIENT
Start: 2024-03-16 | End: 2024-03-16

## 2024-03-16 RX ORDER — KETOROLAC TROMETHAMINE 30 MG/ML
15 SYRINGE (ML) INJECTION EVERY 8 HOURS
Refills: 0 | Status: DISCONTINUED | OUTPATIENT
Start: 2024-03-16 | End: 2024-03-17

## 2024-03-16 RX ADMIN — ONDANSETRON 4 MILLIGRAM(S): 8 TABLET, FILM COATED ORAL at 15:29

## 2024-03-16 RX ADMIN — Medication 1000 MILLIGRAM(S): at 19:30

## 2024-03-16 RX ADMIN — MORPHINE SULFATE 4 MILLIGRAM(S): 50 CAPSULE, EXTENDED RELEASE ORAL at 15:22

## 2024-03-16 RX ADMIN — Medication 400 MILLIGRAM(S): at 22:15

## 2024-03-16 RX ADMIN — Medication 15 MILLIGRAM(S): at 18:41

## 2024-03-16 RX ADMIN — Medication 400 MILLIGRAM(S): at 18:41

## 2024-03-16 RX ADMIN — HYDROMORPHONE HYDROCHLORIDE 0.5 MILLIGRAM(S): 2 INJECTION INTRAMUSCULAR; INTRAVENOUS; SUBCUTANEOUS at 15:48

## 2024-03-16 RX ADMIN — TAMSULOSIN HYDROCHLORIDE 0.4 MILLIGRAM(S): 0.4 CAPSULE ORAL at 18:41

## 2024-03-16 RX ADMIN — SODIUM CHLORIDE 1000 MILLILITER(S): 9 INJECTION, SOLUTION INTRAVENOUS at 15:22

## 2024-03-16 RX ADMIN — Medication 400 MILLIGRAM(S): at 23:00

## 2024-03-16 RX ADMIN — SODIUM CHLORIDE 125 MILLILITER(S): 9 INJECTION INTRAMUSCULAR; INTRAVENOUS; SUBCUTANEOUS at 18:41

## 2024-03-16 RX ADMIN — Medication 15 MILLIGRAM(S): at 15:48

## 2024-03-16 NOTE — ED CDU PROVIDER INITIAL DAY NOTE - DETAILS
pain control, IV fluids, medical expulsive therapy, frequent evaluation.  d/w ED attending d/w Alicia

## 2024-03-16 NOTE — ED PROVIDER NOTE - OBJECTIVE STATEMENT
38-year-old female past medical history of ovarian cyst, recently diagnosed 5 mm left UVJ kidney stone on CT yesterday at [facility], presents to ED today complaining of worsening left flank pain with nausea and vomiting and rigors.  Pain unimproved with oxycodone 5 mg last dose at 11:00.  Notes blood in urine but is currently menstruating.  No fevers. 38-year-old female past medical history of ovarian cyst, recently diagnosed 5 mm left UVJ kidney stone on CT yesterday at [facility], presents to ED today complaining of worsening left flank pain with nausea and vomiting and rigors.  Pain unimproved with oxycodone 5 mg last dose at 11:00.  Notes blood in urine but is currently menstruating.  No fevers.  No extremity numbness or weakness.

## 2024-03-16 NOTE — ED PROVIDER NOTE - PROGRESS NOTE DETAILS
Manolo Khalil PA-C: Patient's pain significantly improved after 4 of morphine, 0.5 of Dilaudid, 15 mg of Toradol.  Obtain renal ultrasound.  Case discussed with urology who will see patient in ED.  Patient agreeable to overnight CDU observation for continued IV fluids, symptom management and urology following.

## 2024-03-16 NOTE — ED CDU PROVIDER INITIAL DAY NOTE - CLINICAL SUMMARY MEDICAL DECISION MAKING FREE TEXT BOX
Deangelo Vargas DO: 38-year-old female with history of ovarian cyst, seen in [facility] ED yesterday for evaluation of left-sided abdominal pain and nausea with vomiting, CT scan then showing left-sided 5 mm stone at left UVJ ultimately discharged home following pain control negative urine, presenting to ER today for recurrence of severe pain with nausea and vomiting.  Reports blood in urine but is also presently menstruating.  Denies fevers.  On exam patient uncomfortable appearing.  Moderate distress due to pain.  Tenderness reported with percussion in the left CVA, otherwise abdomen not significantly tender to palpation.  Vital signs stable in triage with exception of mild tachypnea, afebrile.  Screening blood work and urinalysis ordered and showing white count 13 bicarb 18, mild increase in creatinine to 1.06, urine with large blood, but no signs of infection.  Patient given symptomatic treatment, IV fluids, feeling improved after these treatments.  Patient evaluated by urology.  In conjunction with urology, plan for CDU observation with urology following.  Plan for ultimate discharge versus stent placement based on patient reassessments in CDU.  Patient agreeable to plan and accepted to CDU.

## 2024-03-16 NOTE — ED CDU PROVIDER INITIAL DAY NOTE - SEPSIS ALERT QUESTION 1
Respiratory This patient was evaluated for sepsis.  At this time, a diagnosis of sepsis is not supported by the overall clinical picture.

## 2024-03-16 NOTE — ED PROVIDER NOTE - ATTENDING APP SHARED VISIT CONTRIBUTION OF CARE
I, Deangelo Vargas, have performed a history and physical exam on this patient, and discussed their management with the LUIS EDUARDO. I have fully participated in the care of this patient. I agree with the history, physical exam, and plan as documented by the LUIS EDUARDO

## 2024-03-16 NOTE — ED CDU PROVIDER INITIAL DAY NOTE - OBJECTIVE STATEMENT
38-year-old female past medical history of ovarian cyst, recently diagnosed 5 mm left UVJ kidney stone on CT yesterday at [facility], presents to ED today complaining of worsening left flank pain with nausea and vomiting and rigors.  Pain unimproved with oxycodone 5 mg last dose at 11:00.  Notes blood in urine but is currently menstruating.  No fevers.  No extremity numbness or weakness.  ED course: WBC 13, anion gap 18, urinalysis showing large blood, RBC, trace ketone.  Urine culture from visit on 3/15/2024 negative showing less than 10,000 normal urogenital salbador.  Patient pain control with morphine 4 mg, hydromorphone 0.5 mg, Toradol 15 mg.  Evaluated urology who advised continue medical expulsive therapy, strain urine, n.p.o. after midnight for possible OR if persistent pain.  Patient sent to CDU for pain control, IV fluids, medical expulsive therapy, frequent evaluation.

## 2024-03-16 NOTE — ED ADULT NURSE NOTE - OBJECTIVE STATEMENT
38 year old female presents ambulatory to ED with  from home complaining of left flank pain. States she was at [facility] two days ago and told she has a kidney stone, was treated and felt well enough to be discharged, however this morning pain became much worse, took her oxycodone however threw it up. Currently has her period so unsure if she has blood in her urine specifically. Still endorsing pain and nausea, dry heaving. Denies headache, chest pain, shortness of breath, diarrhea, fevers, chills.

## 2024-03-16 NOTE — ED PROVIDER NOTE - PHYSICAL EXAMINATION
GEN: Patient very uncomfortable appearing, writhing in pain on stretcher.  PSYCH: Affect and mood appropriate.  EYES: Sclera white w/o injection.  ENT: Neck supple. Airway patent.  RESP: CTA b/l.  CARDIAC: RRR, S1, S2, no M/G/R.  ABD: Soft, +L flank ttp, no rebound or guarding, no CVAT.  MSK: VERGARA spontaneously.  NEURO: Nonfocal.  VASC: Strong distal pulses. No edema.  SKIN: No rashes or lesions.

## 2024-03-16 NOTE — ED ADULT NURSE NOTE - TEMPLATE LIST FOR HEAD TO TOE ASSESSMENT
Diabetes being managed by endocrinologist.  On insulin and Metformin. Endocrinologist at [facility]. Twin gestation. 150s and 160s. Considering NIPT. Will discuss with  will make appointment if desire.   To see [facility] for structural survey ultra VIEW ALL

## 2024-03-16 NOTE — ED PROVIDER NOTE - ATTENDING CONTRIBUTION TO CARE
I, Deangelo Vargas, have performed a history and physical exam on this patient, and discussed their management with the resident. I have fully participated in the care of this patient. I agree with the history, physical exam, and plan as documented by the resident

## 2024-03-16 NOTE — CONSULT NOTE ADULT - SUBJECTIVE AND OBJECTIVE BOX
HPI: 38-year-old female past medical history of ovarian cyst, recently diagnosed 5 mm left UVJ kidney stone on CT yesterday at [facility], presents to ED today complaining of worsening left flank pain. Pain associated with nausea and vomiting and chills. Attempted oxycodone for pain, but unable to keep it down 2/2 vomiting. Denies fever, dysuria, or history of prior stones.     Pain now controlled after toradol, morphine and dilaudid. Plan for CDU.     family hx of kidney stones         PAST MEDICAL & SURGICAL HISTORY:  Missed   9 weeks of pregnancy      Broviac site infection, sequela  infection stomach to groin,  for right great big toe osteomyelitis      H/O osteomyelitis  right big toe  infection stomach to groin,  for right great big toe osteomyelitis      H/O bilateral breast reduction surgery      History of       History of D&C        FAMILY HISTORY:    SOCIAL HISTORY:   Tobacco hx:    MEDICATIONS  (STANDING):    MEDICATIONS  (PRN):    Allergies    Keflex (Hives)  Ceclor (Hives)    Intolerances        REVIEW OF SYSTEMS: Pertinent positives and negatives as stated in HPI, otherwise negative    Vital signs  T(C): 36.3 (24 @ 14:36), Max: 36.3 (24 @ 14:36)  HR: 80 (24 @ 16:30)  BP: 123/76 (24 @ 16:30)  SpO2: 98% (24 @ 16:30)  Wt(kg): --    Output    UOP    Physical Exam  Gen: NAD  Pulm: No respiratory distress, no subcostal retractions  CV: no JVD  Abd: Soft, ND, left sided TTP  : voiding    LABS:           @ 15:35    WBC 13.14 / Hct 39.0  / SCr 1.06     03-15 @ 00:31    WBC 12.06 / Hct 38.6  / SCr 0.88         140  |  104  |  12  ----------------------------<  105<H>  3.8   |  18<L>  |  1.06    Ca    9.5      16 Mar 2024 15:35    TPro  7.8  /  Alb  4.5  /  TBili  0.4  /  DBili  x   /  AST  29  /  ALT  96<H>  /  AlkPhos  74        Urinalysis Basic - ( 16 Mar 2024 17:03 )    Color: Yellow / Appearance: Clear / S.012 / pH: x  Gluc: x / Ketone: Trace mg/dL  / Bili: Negative / Urobili: 1.0 mg/dL   Blood: x / Protein: Negative mg/dL / Nitrite: Negative   Leuk Esterase: Negative / RBC: 89 /HPF / WBC 1 /HPF   Sq Epi: x / Non Sq Epi: 3 /HPF / Bacteria: Negative /HPF        Urine Cx: neg       RADIOLOGY:    < from: CT Abdomen and Pelvis w/ IV Cont (03.15.24 @ 01:26) >  IMPRESSION:  1. Obstructing 5 x 4 mm left ureterovesicular junction calculus.  2. Questionable partially visualized 6 mm right middle lobe lungnodule   versus a vascular structure. Consider a nonemergent CT of the chest for   further evaluation.    < end of copied text >   HPI: 39yo F h/o ovarian cyst, recently diagnosed with a 5 mm left UVJ kidney stone on CT yesterday at [facility], presents to ED today complaining of worsening left flank pain. Pain associated with nausea, vomiting and chills. Attempted oxycodone for pain at home, but unable to keep it down 2/2 vomiting. Denies fever, dysuria, or history of prior stones.     Pain now controlled after toradol, morphine and dilaudid. Plan for CDU.     family hx of kidney stones     PAST MEDICAL & SURGICAL HISTORY:  Missed   9 weeks of pregnancy      Broviac site infection, sequela  infection stomach to groin,  for right great big toe osteomyelitis      H/O osteomyelitis  right big toe  infection stomach to groin,  for right great big toe osteomyelitis      H/O bilateral breast reduction surgery      History of       History of D&C        FAMILY HISTORY:    SOCIAL HISTORY:   Tobacco hx:    MEDICATIONS  (STANDING):    MEDICATIONS  (PRN):    Allergies    Keflex (Hives)  Ceclor (Hives)    Intolerances        REVIEW OF SYSTEMS: Pertinent positives and negatives as stated in HPI, otherwise negative    Vital signs  T(C): 36.3 (24 @ 14:36), Max: 36.3 (24 @ 14:36)  HR: 80 (24 @ 16:30)  BP: 123/76 (24 @ 16:30)  SpO2: 98% (24 @ 16:30)  Wt(kg): --    Output    UOP    Physical Exam  Gen: NAD  Pulm: No respiratory distress, no subcostal retractions  CV: no JVD  Abd: Soft, ND, mild left sided abdominal TTP  : voiding    LABS:           @ 15:35    WBC 13.14 / Hct 39.0  / SCr 1.06     03-15 @ 00:31    WBC 12.06 / Hct 38.6  / SCr 0.88         140  |  104  |  12  ----------------------------<  105<H>  3.8   |  18<L>  |  1.06    Ca    9.5      16 Mar 2024 15:35    TPro  7.8  /  Alb  4.5  /  TBili  0.4  /  DBili  x   /  AST  29  /  ALT  96<H>  /  AlkPhos  74        Urinalysis Basic - ( 16 Mar 2024 17:03 )    Color: Yellow / Appearance: Clear / S.012 / pH: x  Gluc: x / Ketone: Trace mg/dL  / Bili: Negative / Urobili: 1.0 mg/dL   Blood: x / Protein: Negative mg/dL / Nitrite: Negative   Leuk Esterase: Negative / RBC: 89 /HPF / WBC 1 /HPF   Sq Epi: x / Non Sq Epi: 3 /HPF / Bacteria: Negative /HPF        3/15 Urine Cx: neg   3/16 urine cx: pending       RADIOLOGY:    < from: CT Abdomen and Pelvis w/ IV Cont (03.15.24 @ 01:26) >  IMPRESSION:  1. Obstructing 5 x 4 mm left ureterovesicular junction calculus.  2. Questionable partially visualized 6 mm right middle lobe lungnodule   versus a vascular structure. Consider a nonemergent CT of the chest for   further evaluation.    < end of copied text >

## 2024-03-16 NOTE — ED PROVIDER NOTE - NS ED ATTENDING STATEMENT MOD
Attending with This was a shared visit with the LUIS EDUARDO. I reviewed and verified the documentation.

## 2024-03-16 NOTE — ED ADULT NURSE NOTE - NSFALLUNIVINTERV_ED_ALL_ED
Bed/Stretcher in lowest position, wheels locked, appropriate side rails in place/Call bell, personal items and telephone in reach/Instruct patient to call for assistance before getting out of bed/chair/stretcher/Non-slip footwear applied when patient is off stretcher/[place] to call system/Physically safe environment - no spills, clutter or unnecessary equipment/Purposeful proactive rounding/Room/bathroom lighting operational, light cord in reach

## 2024-03-16 NOTE — CONSULT NOTE ADULT - ASSESSMENT
37yo female with left renal colic 2/2 5mm stone at left UVJ, bounce back from ED yesterday 2/2 pain, nausea/vomiting  - CDU for pain control overnight   - continue tylenol/motrin, oxycodone for breakthrough pain  - flomax  - IVF  - strain urine   - NPO pMN for possible OR tomorrow if pain persists, possible left stent vs URS  - will discuss with on call urologist  39yo female with left renal colic 2/2 5mm stone at left UVJ, bounce back from ED yesterday 2/2 pain, nausea/vomiting    Plan:  - CDU for pain control overnight   - continue tylenol/motrin, oxycodone for breakthrough pain  - Flomax 0.4mg qHS x30 days  - Aggressive hydration  - Strain urine for calculi  - NPO pMN for possible OR tomorrow if pain persists, possible left stent vs URS    Discussed with Dr. Jacque Mccallum [facility]  [address] [place] [phone]

## 2024-03-16 NOTE — ED CDU PROVIDER DISPOSITION NOTE - CLINICAL COURSE
38-year-old female past medical history of ovarian cyst, recently diagnosed 5 mm left UVJ kidney stone on CT yesterday at [facility], presents to ED today complaining of worsening left flank pain with nausea and vomiting and rigors.  Pain unimproved with oxycodone 5 mg last dose at 11:00.  Notes blood in urine but is currently menstruating.  No fevers.  No extremity numbness or weakness.  ED course: WBC 13, anion gap 18, urinalysis showing large blood, RBC, trace ketone.  Urine culture from visit on 3/15/2024 negative showing less than 10,000 normal urogenital salbador.  Patient pain control with morphine 4 mg, hydromorphone 0.5 mg, Toradol 15 mg.  Evaluated urology who advised continue medical expulsive therapy, strain urine, n.p.o. after midnight for possible OR if persistent pain.  Patient sent to CDU for pain control, IV fluids, medical expulsive therapy, frequent evaluation.  CDU Course: ____ 38-year-old female past medical history of ovarian cyst, recently diagnosed 5 mm left UVJ kidney stone on CT yesterday at [facility], presents to ED today complaining of worsening left flank pain with nausea and vomiting and rigors.  Pain unimproved with oxycodone 5 mg last dose at 11:00.  Notes blood in urine but is currently menstruating.  No fevers.  No extremity numbness or weakness.  ED course: WBC 13, anion gap 18, urinalysis showing large blood, RBC, trace ketone.  Urine culture from visit on 3/15/2024 negative showing less than 10,000 normal urogenital salbador.  Patient pain control with morphine 4 mg, hydromorphone 0.5 mg, Toradol 15 mg.  Evaluated urology who advised continue medical expulsive therapy, strain urine, n.p.o. after midnight for possible OR if persistent pain.  Patient sent to CDU for pain control, IV fluids, medical expulsive therapy, frequent evaluation.  CDU Course: patient continued to have pain while in CDU. seen by urology attending who recommended admit for stent. ED attending Dr. Blair aware of above

## 2024-03-16 NOTE — ED PROVIDER NOTE - CLINICAL SUMMARY MEDICAL DECISION MAKING FREE TEXT BOX
Deangelo Vargas DO: 38-year-old female with history of ovarian cyst, seen in [facility] ED yesterday for evaluation of left-sided abdominal pain and nausea with vomiting, CT scan then showing left-sided 5 mm stone at left UVJ ultimately discharged home following pain control negative urine, presenting to ER today for recurrence of severe pain with nausea and vomiting.  Reports blood in urine but is also presently menstruating.  Denies fevers.  On exam patient uncomfortable appearing.  Moderate distress due to pain.  Tenderness reported with percussion in the left CVA, otherwise abdomen not significantly tender to palpation.  Vital signs stable in triage with exception of mild tachypnea, afebrile.  Screening blood work and urinalysis ordered and showing white count 13 bicarb 18, mild increase in creatinine to 1.06, urine with large blood, but no signs of infection.  Patient given symptomatic treatment, IV fluids, feeling improved after these treatments.  Patient evaluated by urology.  In conjunction with urology, plan for CDU observation with urology following.  Plan for ultimate discharge versus stent placement based on patient reassessments in CDU.  Patient agreeable to plan and accepted to CDU.

## 2024-03-16 NOTE — ED CDU PROVIDER INITIAL DAY NOTE - PHYSICAL EXAMINATION
GEN: Pt in NAD  PSYCH: Affect appropriate.  EYES: Sclera white w/o injection.   ENT: Head NCAT. Neck supple FROM.  RESP: CTA b/l, no wheezes, rales, or rhonchi.   CARDIAC: RRR, clear distinct S1, S2, no appreciable murmurs.  ABD: Abdomen soft, L flank ttp, no CVAT b/l.  VASC: No edema or calf tenderness.  SKIN: No notable rash.

## 2024-03-17 ENCOUNTER — RESULT REVIEW (OUTPATIENT)
Age: 39
End: 2024-03-17

## 2024-03-17 ENCOUNTER — TRANSCRIPTION ENCOUNTER (OUTPATIENT)
Age: 39
End: 2024-03-17

## 2024-03-17 VITALS
RESPIRATION RATE: 16 BRPM | TEMPERATURE: 97 F | DIASTOLIC BLOOD PRESSURE: 68 MMHG | HEART RATE: 82 BPM | OXYGEN SATURATION: 100 % | SYSTOLIC BLOOD PRESSURE: 110 MMHG

## 2024-03-17 DIAGNOSIS — N20.0 CALCULUS OF KIDNEY: ICD-10-CM

## 2024-03-17 LAB
ANION GAP SERPL CALC-SCNC: 13 MMOL/L — SIGNIFICANT CHANGE UP (ref 5–17)
BUN SERPL-MCNC: 10 MG/DL — SIGNIFICANT CHANGE UP (ref 7–23)
CALCIUM SERPL-MCNC: 8.2 MG/DL — LOW (ref 8.4–10.5)
CHLORIDE SERPL-SCNC: 107 MMOL/L — SIGNIFICANT CHANGE UP (ref 96–108)
CO2 SERPL-SCNC: 21 MMOL/L — LOW (ref 22–31)
CREAT SERPL-MCNC: 1.05 MG/DL — SIGNIFICANT CHANGE UP (ref 0.5–1.3)
EGFR: 71 ML/MIN/1.73M2 — SIGNIFICANT CHANGE UP
GLUCOSE SERPL-MCNC: 80 MG/DL — SIGNIFICANT CHANGE UP (ref 70–99)
HCT VFR BLD CALC: 36.8 % — SIGNIFICANT CHANGE UP (ref 34.5–45)
HGB BLD-MCNC: 12.3 G/DL — SIGNIFICANT CHANGE UP (ref 11.5–15.5)
MCHC RBC-ENTMCNC: 29.6 PG — SIGNIFICANT CHANGE UP (ref 27–34)
MCHC RBC-ENTMCNC: 33.4 GM/DL — SIGNIFICANT CHANGE UP (ref 32–36)
MCV RBC AUTO: 88.7 FL — SIGNIFICANT CHANGE UP (ref 80–100)
NRBC # BLD: 0 /100 WBCS — SIGNIFICANT CHANGE UP (ref 0–0)
PLATELET # BLD AUTO: 232 K/UL — SIGNIFICANT CHANGE UP (ref 150–400)
POTASSIUM SERPL-MCNC: 3.3 MMOL/L — LOW (ref 3.5–5.3)
POTASSIUM SERPL-SCNC: 3.3 MMOL/L — LOW (ref 3.5–5.3)
RBC # BLD: 4.15 M/UL — SIGNIFICANT CHANGE UP (ref 3.8–5.2)
RBC # FLD: 12.2 % — SIGNIFICANT CHANGE UP (ref 10.3–14.5)
SODIUM SERPL-SCNC: 141 MMOL/L — SIGNIFICANT CHANGE UP (ref 135–145)
WBC # BLD: 6.73 K/UL — SIGNIFICANT CHANGE UP (ref 3.8–10.5)
WBC # FLD AUTO: 6.73 K/UL — SIGNIFICANT CHANGE UP (ref 3.8–10.5)

## 2024-03-17 PROCEDURE — 99222 1ST HOSP IP/OBS MODERATE 55: CPT | Mod: 57,25

## 2024-03-17 PROCEDURE — 88300 SURGICAL PATH GROSS: CPT | Mod: 26

## 2024-03-17 PROCEDURE — 99233 SBSQ HOSP IP/OBS HIGH 50: CPT

## 2024-03-17 PROCEDURE — 52352 CYSTOURETERO W/STONE REMOVE: CPT | Mod: LT

## 2024-03-17 DEVICE — URETERAL CATH OPEN END 5FR 70CM: Type: IMPLANTABLE DEVICE | Site: LEFT | Status: FUNCTIONAL

## 2024-03-17 DEVICE — STONE BASKET ZEROTIP NITINOL 4-WIRE 1.9FR 120CM X 12MM: Type: IMPLANTABLE DEVICE | Site: LEFT | Status: FUNCTIONAL

## 2024-03-17 DEVICE — GUIDEWIRE SENSOR DUAL-FLEX NITINOL STRAIGHT .035" X 150CM: Type: IMPLANTABLE DEVICE | Site: LEFT | Status: FUNCTIONAL

## 2024-03-17 RX ORDER — MORPHINE SULFATE 50 MG/1
4 CAPSULE, EXTENDED RELEASE ORAL ONCE
Refills: 0 | Status: DISCONTINUED | OUTPATIENT
Start: 2024-03-17 | End: 2024-03-17

## 2024-03-17 RX ORDER — ONDANSETRON 8 MG/1
4 TABLET, FILM COATED ORAL EVERY 6 HOURS
Refills: 0 | Status: DISCONTINUED | OUTPATIENT
Start: 2024-03-17 | End: 2024-03-17

## 2024-03-17 RX ORDER — OXYCODONE HYDROCHLORIDE 5 MG/1
5 TABLET ORAL ONCE
Refills: 0 | Status: DISCONTINUED | OUTPATIENT
Start: 2024-03-17 | End: 2024-03-17

## 2024-03-17 RX ORDER — FENTANYL CITRATE 50 UG/ML
25 INJECTION INTRAVENOUS
Refills: 0 | Status: DISCONTINUED | OUTPATIENT
Start: 2024-03-17 | End: 2024-03-17

## 2024-03-17 RX ORDER — ACETAMINOPHEN 500 MG
650 TABLET ORAL EVERY 6 HOURS
Refills: 0 | Status: DISCONTINUED | OUTPATIENT
Start: 2024-03-17 | End: 2024-03-17

## 2024-03-17 RX ORDER — ACETAMINOPHEN 500 MG
1000 TABLET ORAL ONCE
Refills: 0 | Status: COMPLETED | OUTPATIENT
Start: 2024-03-17 | End: 2024-03-17

## 2024-03-17 RX ORDER — POTASSIUM CHLORIDE 20 MEQ
40 PACKET (EA) ORAL ONCE
Refills: 0 | Status: COMPLETED | OUTPATIENT
Start: 2024-03-17 | End: 2024-03-17

## 2024-03-17 RX ORDER — METOCLOPRAMIDE HCL 10 MG
10 TABLET ORAL ONCE
Refills: 0 | Status: DISCONTINUED | OUTPATIENT
Start: 2024-03-17 | End: 2024-03-17

## 2024-03-17 RX ORDER — ONDANSETRON 8 MG/1
4 TABLET, FILM COATED ORAL ONCE
Refills: 0 | Status: DISCONTINUED | OUTPATIENT
Start: 2024-03-17 | End: 2024-03-17

## 2024-03-17 RX ORDER — SODIUM CHLORIDE 9 MG/ML
1000 INJECTION INTRAMUSCULAR; INTRAVENOUS; SUBCUTANEOUS ONCE
Refills: 0 | Status: COMPLETED | OUTPATIENT
Start: 2024-03-17 | End: 2024-03-17

## 2024-03-17 RX ADMIN — SODIUM CHLORIDE 125 MILLILITER(S): 9 INJECTION INTRAMUSCULAR; INTRAVENOUS; SUBCUTANEOUS at 03:26

## 2024-03-17 RX ADMIN — Medication 400 MILLIGRAM(S): at 08:12

## 2024-03-17 RX ADMIN — MORPHINE SULFATE 4 MILLIGRAM(S): 50 CAPSULE, EXTENDED RELEASE ORAL at 00:18

## 2024-03-17 RX ADMIN — Medication 150 MILLIGRAM(S): at 08:12

## 2024-03-17 RX ADMIN — Medication 40 MILLIEQUIVALENT(S): at 08:12

## 2024-03-17 RX ADMIN — SODIUM CHLORIDE 1000 MILLILITER(S): 9 INJECTION INTRAMUSCULAR; INTRAVENOUS; SUBCUTANEOUS at 04:41

## 2024-03-17 RX ADMIN — MORPHINE SULFATE 4 MILLIGRAM(S): 50 CAPSULE, EXTENDED RELEASE ORAL at 00:33

## 2024-03-17 NOTE — H&P ADULT - ATTENDING COMMENTS
CT imaging reviewed.  Distal obstructing stone, left UVJ. Failed medical therapy with persistent pain.  Recommend OR for ureteroscopy and stone extraction, possible stent.  Patient consented.

## 2024-03-17 NOTE — H&P ADULT - HISTORY OF PRESENT ILLNESS
39yo F h/o ovarian cyst, recently diagnosed with a 5 mm left UVJ kidney stone on CT yesterday at [facility], presents to ED today complaining of worsening left flank pain. Pain associated with nausea, vomiting and chills. Attempted oxycodone for pain at home, but unable to keep it down 2/2 vomiting. Denies fever, dysuria, or history of prior stones.     Pain controlled after toradol, morphine and dilaudid.  CDU overnight, but still with intermittent pain.     family hx of kidney stones

## 2024-03-17 NOTE — DISCHARGE NOTE NURSING/CASE MANAGEMENT/SOCIAL WORK - NSDCVIVACCINE_GEN_ALL_CORE_FT
Tdap; 25-Jun-2016 07:05; [place] Mancera (RN); [employer]; [id number]; IntraMuscular; Deltoid Left.; 0.5 milliLiter(s); VIS (VIS Published: 09-May-2013, VIS Presented: 25-Jun-2016);   Tdap; 25-Jun-2016 07:08; [place] Mancera (RN); [employer]; [id number]; IntraMuscular; Deltoid Left.; 0.5 milliLiter(s); VIS (VIS Published: 09-May-2013, VIS Presented: 25-Jun-2016);

## 2024-03-17 NOTE — ED CDU PROVIDER SUBSEQUENT DAY NOTE - PROGRESS NOTE DETAILS
BEVERLEY Brown: continues to have pain requesting tylenol. urology at bedside patient to go to OR tomorrow

## 2024-03-17 NOTE — H&P ADULT - ASSESSMENT
37yo female with left renal colic 2/2 5mm stone at left UVJ,    - admit to urology  - continue tylenol/motrin, oxycodone for breakthrough pain  - Flomax 0.4mg   - Aggressive hydration  - Strain urine for calculi  - NPO for left stent vs URS today

## 2024-03-17 NOTE — H&P ADULT - NSHPLABSRESULTS_GEN_ALL_CORE
Lab Results:  CBC  CBC Full  -  ( 17 Mar 2024 05:16 )  WBC Count : 6.73 K/uL  RBC Count : 4.15 M/uL  Hemoglobin : 12.3 g/dL  Hematocrit : 36.8 %  Platelet Count - Automated : 232 K/uL  Mean Cell Volume : 88.7 fl  Mean Cell Hemoglobin : 29.6 pg  Mean Cell Hemoglobin Concentration : 33.4 gm/dL  Auto Neutrophil # : x  Auto Lymphocyte # : x  Auto Monocyte # : x  Auto Eosinophil # : x  Auto Basophil # : x  Auto Neutrophil % : x  Auto Lymphocyte % : x  Auto Monocyte % : x  Auto Eosinophil % : x  Auto Basophil % : x    .		Differential:	[] Automated		[] Manual  Chemistry  03-17    141  |  107  |  10  ----------------------------<  80  3.3<L>   |  21<L>  |  1.05    Ca    8.2<L>      17 Mar 2024 05:16    TPro  7.8  /  Alb  4.5  /  TBili  0.4  /  DBili  x   /  AST  29  /  ALT  96<H>  /  AlkPhos  74  03-16    LIVER FUNCTIONS - ( 16 Mar 2024 15:35 )  Alb: 4.5 g/dL / Pro: 7.8 g/dL / ALK PHOS: 74 U/L / ALT: 96 U/L / AST: 29 U/L / GGT: x           PT/INR - ( 16 Mar 2024 20:20 )   PT: 11.5 sec;   INR: 1.10 ratio         PTT - ( 16 Mar 2024 20:20 )  PTT:29.8 sec  Urinalysis Basic - ( 17 Mar 2024 05:16 )    Color: x / Appearance: x / SG: x / pH: x  Gluc: 80 mg/dL / Ketone: x  / Bili: x / Urobili: x   Blood: x / Protein: x / Nitrite: x   Leuk Esterase: x / RBC: x / WBC x   Sq Epi: x / Non Sq Epi: x / Bacteria: x        MICROBIOLOGY/CULTURES:  Culture Results:   <10,000 CFU/mL Normal Urogenital Henna (03-15 @ 04:20)    RADIOLOGY RESULTS:  < from: CT Abdomen and Pelvis w/ IV Cont (03.15.24 @ 01:26) >    IMPRESSION:  1. Obstructing 5 x 4 mm left ureterovesicular junction calculus.  2. Questionable partially visualized 6 mm right middle lobe lungnodule   versus a vascular structure. Consider a nonemergent CT of the chest for   further evaluation.    < end of copied text >

## 2024-03-17 NOTE — DISCHARGE NOTE PROVIDER - CARE PROVIDER_API CALL
Jessica Amaya [facility]  Urology  [address] [place] [address]  Phone: [phone]  Fax: [phone]  Follow Up Time:

## 2024-03-17 NOTE — DISCHARGE NOTE PROVIDER - NSDCCPCAREPLAN_GEN_ALL_CORE_FT
PRINCIPAL DISCHARGE DIAGNOSIS  Diagnosis: Left nephrolithiasis  Assessment and Plan of Treatment:

## 2024-03-17 NOTE — PRE-ANESTHESIA EVALUATION ADULT - NSANTHPMHFT_GEN_ALL_CORE
39yo F h/o ovarian cyst, recently diagnosed with a 5 mm left UVJ kidney stone on CT yesterday at [facility], presents to ED today complaining of worsening left flank pain. Going to OR for cysto 37yo F h/o ovarian cyst, recently diagnosed with a 5 mm left UVJ kidney stone on CT yesterday at [facility], presents to ED today complaining of worsening left flank pain. Going to OR for cysto.  METS > 4, denies nausea/vomiting.

## 2024-03-17 NOTE — DISCHARGE NOTE PROVIDER - HOSPITAL COURSE
39yo F h/o ovarian cyst, recently diagnosed with a 5 mm left UVJ kidney stone on CT 3/15 at [facility], presents to ED 3/16 complaining of worsening left flank pain. Pain associated with nausea, vomiting and chills. Attempted oxycodone for pain at home, but unable to keep it down 2/2 vomiting. Denies fever, dysuria, or history of prior stones.     Pain controlled after toradol, morphine and dilaudid.  CDU overnight, but still with intermittent pain. On 3/17 decided she would like to proceed with ureteroscopy and stone extraction.  Underwent uncomplicated ureteroscopy on 3/17 and was discharged home from PACU.

## 2024-03-17 NOTE — DISCHARGE NOTE PROVIDER - NSDCFUSCHEDAPPT_GEN_ALL_CORE_FT
Jessica Amaya  [facility]  UROLOGY [address]  Scheduled Appointment: 03/27/2024    Jessica Rainey  [facility]  WEIGHTMGMT  [facility]  Scheduled Appointment: 04/03/2024

## 2024-03-17 NOTE — ED CDU PROVIDER SUBSEQUENT DAY NOTE - ATTENDING APP SHARED VISIT CONTRIBUTION OF CARE
HX: pt observed for L kidney stone, no events overnight, pain present but improved, no fever, slight nausea, no vomiting today.    PE: well appearing, nontoxic, no respiratory distress.  Neuro nonfocal.  Skin intact. Psych normal mood.    MDM: Reviewed cbc, cmp, UA, no signs for sepsis, renal function normal, ct scan report reviewed, consultant urologist notes reviewed. L ureter stone with pain and nausea, urologist consult, determined need for OR intervention for persistent pain, for inpatient treatment.

## 2024-03-17 NOTE — H&P ADULT - NSHPPHYSICALEXAM_GEN_ALL_CORE
Vital Signs Last 24 Hrs  T(C): 36.8 (17 Mar 2024 07:28), Max: 36.8 (16 Mar 2024 20:29)  T(F): 98.3 (17 Mar 2024 07:28), Max: 98.3 (17 Mar 2024 07:28)  HR: 79 (17 Mar 2024 07:28) (66 - 91)  BP: 92/49 (17 Mar 2024 07:28) (91/51 - 136/93)  BP(mean): 77 (17 Mar 2024 00:33) (77 - 109)  RR: 17 (17 Mar 2024 07:28) (16 - 24)  SpO2: 96% (17 Mar 2024 07:28) (96% - 100%)    Parameters below as of 17 Mar 2024 07:28  Patient On (Oxygen Delivery Method): room air    GEN: NAD  ABD: soft, left sided abdominal TTP  : voiding

## 2024-03-17 NOTE — BRIEF OPERATIVE NOTE - NSICDXBRIEFPROCEDURE_GEN_ALL_CORE_FT
PROCEDURES:  Cystoscopy with left ureteroscopy with extraction of calculus using stone retrieval basket 17-Mar-2024 14:39:30  Nadeem Christopher

## 2024-03-17 NOTE — ED CDU PROVIDER SUBSEQUENT DAY NOTE - HISTORY
CDU PROGRESS NOTE BEVERLEY VILLA: Pt reports 7/10 recurrent flank pain. overall well appearing, soft, benign abdomen. VSS. will give analgesic, reassess. npo at this time. plan for am labs.

## 2024-03-17 NOTE — DISCHARGE NOTE PROVIDER - NSDCMRMEDTOKEN_GEN_ALL_CORE_FT
acetaminophen 325 mg oral tablet: 3 tab(s) orally every 6 hours  ibuprofen 600 mg oral tablet: 1 tab(s) orally every 6 hours  ondansetron 4 mg oral tablet, disintegratin tab(s) orally every 8 hours as needed for  nausea  oxyCODONE 5 mg oral tablet: 1 tab(s) orally every 6 hours as needed for  severe pain MDD: 1  oxyCODONE 5 mg oral tablet: 1 tab(s) orally every 8 hours MDD: 3

## 2024-03-17 NOTE — PRE-ANESTHESIA EVALUATION ADULT - NSANTHPEFT_GEN_ALL_CORE
DISPLAY PLAN FREE TEXT Awake and alert, no focal deficits  Nonlabored respirations, no wheezing  Normal rate/rhythm, normal peripheral perfusion, no murmur  Abd obese, soft, nondistended

## 2024-03-17 NOTE — ED ADULT NURSE REASSESSMENT NOTE - NSFALLUNIVINTERV_ED_ALL_ED
Bed/Stretcher in lowest position, wheels locked, appropriate side rails in place/Call bell, personal items and telephone in reach/Instruct patient to call for assistance before getting out of bed/chair/stretcher/Non-slip footwear applied when patient is off stretcher/[place] to call system/Physically safe environment - no spills, clutter or unnecessary equipment/Purposeful proactive rounding/Room/bathroom lighting operational, light cord in reach

## 2024-03-17 NOTE — DISCHARGE NOTE PROVIDER - NSDCFUADDINST_GEN_ALL_CORE_FT
GENERAL: It is common to have blood in your urine after your procedure. It may be pink or even red; inform your doctor if you have a significant amount of clot in the urine or if you are unable to void at all. The urine may clear and then become bloody again especially as you are more physically active.  BATHING: You may shower or bathe.  DIET: You may resume your regular diet and regular medication regimen.  PAIN: You may take Tylenol (acetaminophen) 650-975mg and/or Motrin (ibuprofen) 400-600mg, both available over the counter, for pain every 6 hours as needed. Do not exceed 4000mg of Tylenol (acetaminophen) daily. You may alternate these medications such that you take one or the other every 3 hours for around the clock pain coverage.   STOOL SOFTENERS: Do not allow yourself to become constipated as straining may cause bleeding. Take stool softeners or a laxative (ex. Miralax, Colace, Senokot, ExLax, etc), available over the counter, if needed.  ACTIVITY: No heavy lifting or strenuous exercise until you are evaluated at your post-operative appointment. Otherwise, you may return to your usual level of physical activity.  ANTICOAGULATION: If you are taking any blood thinning medications, please discuss with your urologist prior to restarting these medications unless otherwise specified.  FOLLOW-UP: If you did not already schedule your post-operative appointment, please call your urologist to schedule a follow-up appointment.  CALL YOUR UROLOGIST IF: You have any bleeding that does not stop, inability to void >8 hours, fever over 100.4 F, chills, persistent nausea/vomiting, changes in your incision concerning for infection, or if your pain is not controlled on your discharge pain medications.

## 2024-03-17 NOTE — DISCHARGE NOTE NURSING/CASE MANAGEMENT/SOCIAL WORK - NSDCPEFALRISK_GEN_ALL_CORE
For information on Fall & Injury Prevention, visit: [email or web address] OR  [email or web address] OR  https://www.cdc.gov/steadi/patient.html

## 2024-03-18 ENCOUNTER — APPOINTMENT (OUTPATIENT)
Dept: UROLOGY | Facility: CLINIC | Age: 39
End: 2024-03-18

## 2024-03-18 PROCEDURE — 82435 ASSAY OF BLOOD CHLORIDE: CPT

## 2024-03-18 PROCEDURE — 85730 THROMBOPLASTIN TIME PARTIAL: CPT

## 2024-03-18 PROCEDURE — 87077 CULTURE AEROBIC IDENTIFY: CPT

## 2024-03-18 PROCEDURE — 86900 BLOOD TYPING SEROLOGIC ABO: CPT

## 2024-03-18 PROCEDURE — 82330 ASSAY OF CALCIUM: CPT

## 2024-03-18 PROCEDURE — 96375 TX/PRO/DX INJ NEW DRUG ADDON: CPT

## 2024-03-18 PROCEDURE — 82365 CALCULUS SPECTROSCOPY: CPT

## 2024-03-18 PROCEDURE — 87086 URINE CULTURE/COLONY COUNT: CPT

## 2024-03-18 PROCEDURE — 76770 US EXAM ABDO BACK WALL COMP: CPT

## 2024-03-18 PROCEDURE — 85027 COMPLETE CBC AUTOMATED: CPT

## 2024-03-18 PROCEDURE — 80053 COMPREHEN METABOLIC PANEL: CPT

## 2024-03-18 PROCEDURE — 96376 TX/PRO/DX INJ SAME DRUG ADON: CPT

## 2024-03-18 PROCEDURE — 81001 URINALYSIS AUTO W/SCOPE: CPT

## 2024-03-18 PROCEDURE — 84132 ASSAY OF SERUM POTASSIUM: CPT

## 2024-03-18 PROCEDURE — C1889: CPT

## 2024-03-18 PROCEDURE — 86901 BLOOD TYPING SEROLOGIC RH(D): CPT

## 2024-03-18 PROCEDURE — 84295 ASSAY OF SERUM SODIUM: CPT

## 2024-03-18 PROCEDURE — 85025 COMPLETE CBC W/AUTO DIFF WBC: CPT

## 2024-03-18 PROCEDURE — 82803 BLOOD GASES ANY COMBINATION: CPT

## 2024-03-18 PROCEDURE — 76000 FLUOROSCOPY <1 HR PHYS/QHP: CPT

## 2024-03-18 PROCEDURE — 96374 THER/PROPH/DIAG INJ IV PUSH: CPT

## 2024-03-18 PROCEDURE — 85018 HEMOGLOBIN: CPT

## 2024-03-18 PROCEDURE — 87186 SC STD MICRODIL/AGAR DIL: CPT

## 2024-03-18 PROCEDURE — 83605 ASSAY OF LACTIC ACID: CPT

## 2024-03-18 PROCEDURE — C1758: CPT

## 2024-03-18 PROCEDURE — 85014 HEMATOCRIT: CPT

## 2024-03-18 PROCEDURE — 86850 RBC ANTIBODY SCREEN: CPT

## 2024-03-18 PROCEDURE — G0378: CPT

## 2024-03-18 PROCEDURE — 80048 BASIC METABOLIC PNL TOTAL CA: CPT

## 2024-03-18 PROCEDURE — C9399: CPT

## 2024-03-18 PROCEDURE — 99285 EMERGENCY DEPT VISIT HI MDM: CPT | Mod: 25

## 2024-03-18 PROCEDURE — 82947 ASSAY GLUCOSE BLOOD QUANT: CPT

## 2024-03-18 PROCEDURE — 85610 PROTHROMBIN TIME: CPT

## 2024-03-18 PROCEDURE — C1769: CPT

## 2024-03-18 PROCEDURE — 88300 SURGICAL PATH GROSS: CPT

## 2024-03-19 LAB
-  AMOXICILLIN/CLAVULANIC ACID: SIGNIFICANT CHANGE UP
-  AMPICILLIN/SULBACTAM: SIGNIFICANT CHANGE UP
-  AMPICILLIN: SIGNIFICANT CHANGE UP
-  AMPICILLIN: SIGNIFICANT CHANGE UP
-  AZTREONAM: SIGNIFICANT CHANGE UP
-  CEFAZOLIN: SIGNIFICANT CHANGE UP
-  CEFEPIME: SIGNIFICANT CHANGE UP
-  CEFOXITIN: SIGNIFICANT CHANGE UP
-  CEFTRIAXONE: SIGNIFICANT CHANGE UP
-  CEFUROXIME: SIGNIFICANT CHANGE UP
-  CIPROFLOXACIN: SIGNIFICANT CHANGE UP
-  CIPROFLOXACIN: SIGNIFICANT CHANGE UP
-  ERTAPENEM: SIGNIFICANT CHANGE UP
-  GENTAMICIN: SIGNIFICANT CHANGE UP
-  IMIPENEM: SIGNIFICANT CHANGE UP
-  LEVOFLOXACIN: SIGNIFICANT CHANGE UP
-  LEVOFLOXACIN: SIGNIFICANT CHANGE UP
-  MEROPENEM: SIGNIFICANT CHANGE UP
-  NITROFURANTOIN: SIGNIFICANT CHANGE UP
-  NITROFURANTOIN: SIGNIFICANT CHANGE UP
-  PIPERACILLIN/TAZOBACTAM: SIGNIFICANT CHANGE UP
-  TETRACYCLINE: SIGNIFICANT CHANGE UP
-  TOBRAMYCIN: SIGNIFICANT CHANGE UP
-  TRIMETHOPRIM/SULFAMETHOXAZOLE: SIGNIFICANT CHANGE UP
-  VANCOMYCIN: SIGNIFICANT CHANGE UP
CULTURE RESULTS: ABNORMAL
METHOD TYPE: SIGNIFICANT CHANGE UP
METHOD TYPE: SIGNIFICANT CHANGE UP
ORGANISM # SPEC MICROSCOPIC CNT: ABNORMAL
SPECIMEN SOURCE: SIGNIFICANT CHANGE UP

## 2024-03-21 RX ORDER — AMOXICILLIN AND CLAVULANATE POTASSIUM 875; 125 MG/1; MG/1
875-125 TABLET, COATED ORAL
Qty: 10 | Refills: 0 | Status: ACTIVE | COMMUNITY
Start: 2024-03-21 | End: 1900-01-01

## 2024-03-22 LAB
CELL MATERIAL STONE EST-MCNT: SIGNIFICANT CHANGE UP
LABORATORY COMMENT REPORT: SIGNIFICANT CHANGE UP
NIDUS STONE QN: SIGNIFICANT CHANGE UP
SURGICAL PATHOLOGY STUDY: SIGNIFICANT CHANGE UP

## 2024-03-27 ENCOUNTER — APPOINTMENT (OUTPATIENT)
Dept: UROLOGY | Facility: CLINIC | Age: 39
End: 2024-03-27
Payer: COMMERCIAL

## 2024-03-27 VITALS
RESPIRATION RATE: 17 BRPM | HEIGHT: 65 IN | TEMPERATURE: 97.7 F | BODY MASS INDEX: 44.15 KG/M2 | SYSTOLIC BLOOD PRESSURE: 117 MMHG | HEART RATE: 99 BPM | OXYGEN SATURATION: 97 % | WEIGHT: 265 LBS | DIASTOLIC BLOOD PRESSURE: 83 MMHG

## 2024-03-27 DIAGNOSIS — Z87.39 PERSONAL HISTORY OF OTHER DISEASES OF THE MUSCULOSKELETAL SYSTEM AND CONNECTIVE TISSUE: ICD-10-CM

## 2024-03-27 DIAGNOSIS — Z83.49 FAMILY HISTORY OF OTHER ENDOCRINE, NUTRITIONAL AND METABOLIC DISEASES: ICD-10-CM

## 2024-03-27 DIAGNOSIS — Z82.49 FAMILY HISTORY OF ISCHEMIC HEART DISEASE AND OTHER DISEASES OF THE CIRCULATORY SYSTEM: ICD-10-CM

## 2024-03-27 DIAGNOSIS — Z84.1 FAMILY HISTORY OF DISORDERS OF KIDNEY AND URETER: ICD-10-CM

## 2024-03-27 DIAGNOSIS — N20.0 CALCULUS OF KIDNEY: ICD-10-CM

## 2024-03-27 PROCEDURE — 99204 OFFICE O/P NEW MOD 45 MIN: CPT

## 2024-03-27 PROCEDURE — 99214 OFFICE O/P EST MOD 30 MIN: CPT

## 2024-03-27 RX ORDER — VENLAFAXINE HYDROCHLORIDE 150 MG/1
150 TABLET, EXTENDED RELEASE ORAL
Refills: 0 | Status: ACTIVE | COMMUNITY

## 2024-03-27 NOTE — ASSESSMENT
[FreeTextEntry1] : 39 yo F with nephrolithiasis  - Reviewed records from hospitalization at [facility] including CT imaging through PACS and labwork and confirmed findings as stated above - Discussed pros and cons of metabolic stone workup given family history - will proceed with litholink and metabolic blood work - Discussed possible etiologies for nephrolithiasis. Reviewed behavioral modifications including adequate hydration, cutting back on coffee, dark sodas, dark teas, low sodium diet, increasing citrate levels with lemon juice.  - Discussed importance of seeking medical attention should intractable flank pain with nausea, vomiting or fever occur - FU in 2 months

## 2024-03-27 NOTE — PHYSICAL EXAM
[Normal Appearance] : normal appearance [Well Groomed] : well groomed [General Appearance - In No Acute Distress] : no acute distress [Edema] : no peripheral edema [Respiration, Rhythm And Depth] : normal respiratory rhythm and effort [Exaggerated Use Of Accessory Muscles For Inspiration] : no accessory muscle use [Abdomen Soft] : soft [Abdomen Tenderness] : non-tender [Costovertebral Angle Tenderness] : no ~M costovertebral angle tenderness [Normal Station and Gait] : the gait and station were normal for the patient's age [Urinary Bladder Findings] : the bladder was normal on palpation [] : no rash [No Focal Deficits] : no focal deficits [Oriented To Time, Place, And Person] : oriented to person, place, and time [Affect] : the affect was normal [No Palpable Adenopathy] : no palpable adenopathy [Mood] : the mood was normal

## 2024-03-27 NOTE — REVIEW OF SYSTEMS
[Dry Eyes] : dryness of the eyes [Sore Throat] : sore throat [Loss of interest] : loss of interest in sexual activity [Cough] : cough [Genital yeast infection] : genital yeast infection [Date of last menstrual period ____] : date of last menstrual period: [unfilled] [Joint Pain] : joint pain [Depression] : depression [Anxiety] : anxiety [Hot Flashes] : hot flashes [Negative] : Heme/Lymph

## 2024-03-27 NOTE — HISTORY OF PRESENT ILLNESS
[FreeTextEntry1] : 39 yo F recently presented with left flank pain Found to have obstructive left ureteral stone s/p left URS for stone extraction with Dr. Santillan Doing well since procedure No prior history of stones Strong family history - both parents and brother Drinks 6 cans of seltzer, 4 bottles of water, 3 cups of coffee daily recent UTI - augmentin x 5 days

## 2024-04-03 ENCOUNTER — APPOINTMENT (OUTPATIENT)
Dept: BARIATRICS/WEIGHT MGMT | Facility: CLINIC | Age: 39
End: 2024-04-03
Payer: COMMERCIAL

## 2024-04-03 ENCOUNTER — OUTPATIENT (OUTPATIENT)
Dept: OUTPATIENT SERVICES | Facility: HOSPITAL | Age: 39
LOS: 1 days | End: 2024-04-03
Payer: COMMERCIAL

## 2024-04-03 VITALS — WEIGHT: 247 LBS | HEIGHT: 65 IN | BODY MASS INDEX: 41.15 KG/M2

## 2024-04-03 DIAGNOSIS — Z98.890 OTHER SPECIFIED POSTPROCEDURAL STATES: Chronic | ICD-10-CM

## 2024-04-03 DIAGNOSIS — Z98.891 HISTORY OF UTERINE SCAR FROM PREVIOUS SURGERY: Chronic | ICD-10-CM

## 2024-04-03 DIAGNOSIS — I10 ESSENTIAL (PRIMARY) HYPERTENSION: ICD-10-CM

## 2024-04-03 LAB
ANION GAP SERPL CALC-SCNC: 12 MMOL/L
BUN SERPL-MCNC: 13 MG/DL
CALCIUM SERPL-MCNC: 9.6 MG/DL
CALCIUM SERPL-MCNC: 9.6 MG/DL
CHLORIDE SERPL-SCNC: 101 MMOL/L
CO2 SERPL-SCNC: 29 MMOL/L
CREAT SERPL-MCNC: 0.91 MG/DL
EGFR: 84 ML/MIN/1.73M2
GLUCOSE SERPL-MCNC: 96 MG/DL
PARATHYROID HORMONE INTACT: 31 PG/ML
POTASSIUM SERPL-SCNC: 3.8 MMOL/L
SODIUM SERPL-SCNC: 141 MMOL/L
URATE SERPL-MCNC: 5.3 MG/DL

## 2024-04-03 PROCEDURE — G0463: CPT

## 2024-04-03 PROCEDURE — 99213 OFFICE O/P EST LOW 20 MIN: CPT | Mod: 95

## 2024-04-03 NOTE — ASSESSMENT
[FreeTextEntry1] : Bariatric surgery history: none Obesity co - morbidities: depression, FISH, pcos  Comorbidities improved or resolved: none Anti-obesity medications: metformin, mounjaro  Obesity medication side effects: none  Plan:   contnue zepbound 10mg -  got a letter that said saxenda unable to be filled, but it was not denied. most likely back ordered  keep food journal  3 meals no snacks  avoid carb at dinner continue regular exercise- walk daily     f/u 4 weeks

## 2024-04-03 NOTE — HISTORY OF PRESENT ILLNESS
[FreeTextEntry1] : This is a 38 year old female  being seen obesity followup visit.   She is taking zepbound 10mg, tolerating well metformin 2000mg daily decreased effexor to 150mg- feels better on lower dose   Eating 3 meals a day no snacking  removed carb from dinner   Has been walking 2 miles with double stroller every other day   had kidney stone removed last week   has amilcar- unable to sleep with cpap. discussed with pulm will repeat sleep test at 220 pounds

## 2024-04-08 DIAGNOSIS — E16.1 OTHER HYPOGLYCEMIA: ICD-10-CM

## 2024-04-08 DIAGNOSIS — G47.33 OBSTRUCTIVE SLEEP APNEA (ADULT) (PEDIATRIC): ICD-10-CM

## 2024-04-08 DIAGNOSIS — E66.01 MORBID (SEVERE) OBESITY DUE TO EXCESS CALORIES: ICD-10-CM

## 2024-04-08 DIAGNOSIS — E28.2 POLYCYSTIC OVARIAN SYNDROME: ICD-10-CM
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
## 2024-04-11 NOTE — OB PROVIDER H&P - ATTENDING COMMENTS
3/15/2024 negative Agree with above  Patient for repeat c/s and b/l salpingectomy  All r/b/a discussed    nataly truong

## 2024-05-01 ENCOUNTER — APPOINTMENT (OUTPATIENT)
Dept: OTOLARYNGOLOGY | Facility: CLINIC | Age: 39
End: 2024-05-01
Payer: SELF-PAY

## 2024-05-01 PROCEDURE — G0429: CPT

## 2024-05-01 PROCEDURE — D0090A COSMETIC BOTOX: CUSTOM

## 2024-05-07 NOTE — PHYSICAL EXAM
[de-identified] :  39 y/o patient presents to office for cosmetic consultation.  Pt would like to achieve a rejuvenated appearance and reduce facial wrinkles. Pt has had neurotoxin treatment in the past.   Procedure:  Patients name and date of birth confirmed. Pt skin was sterilized prepped and marked. Photo documentation and consent obtained.     Pt would like to achieve jaimes lips Pt recieved .5cc of Kysse to upper and lower lips  Pt received 40 Units @ 4u dilution.   Treatment area: glabella, frontalis and crows   Neurotoxin: Botox See procedure note  Lot Number:  Expiration Date:    Pt tolerated procedure well with no complications.    Post- treatment:  Pt education includes no rubbing or touch injection sites. No tight fitting hats or headbands, No sweating or excessive exercise for 24 hours.  Pt can return to office for touch-up within 2 weeks and Pt will f/u in 3-4 months for cosmetic follow-up.

## 2024-05-09 ENCOUNTER — APPOINTMENT (OUTPATIENT)
Dept: BARIATRICS/WEIGHT MGMT | Facility: CLINIC | Age: 39
End: 2024-05-09
Payer: COMMERCIAL

## 2024-05-09 ENCOUNTER — OUTPATIENT (OUTPATIENT)
Dept: OUTPATIENT SERVICES | Facility: HOSPITAL | Age: 39
LOS: 1 days | End: 2024-05-09
Payer: COMMERCIAL

## 2024-05-09 VITALS — WEIGHT: 239 LBS | HEIGHT: 65 IN | BODY MASS INDEX: 39.82 KG/M2

## 2024-05-09 DIAGNOSIS — Z98.890 OTHER SPECIFIED POSTPROCEDURAL STATES: Chronic | ICD-10-CM

## 2024-05-09 DIAGNOSIS — I10 ESSENTIAL (PRIMARY) HYPERTENSION: ICD-10-CM

## 2024-05-09 PROCEDURE — 99213 OFFICE O/P EST LOW 20 MIN: CPT | Mod: 95

## 2024-05-09 PROCEDURE — G0463: CPT

## 2024-05-09 RX ORDER — TIRZEPATIDE 15 MG/.5ML
15 INJECTION, SOLUTION SUBCUTANEOUS
Qty: 3 | Refills: 2 | Status: ACTIVE | COMMUNITY
Start: 2024-01-16 | End: 1900-01-01

## 2024-05-09 RX ORDER — LIRAGLUTIDE 6 MG/ML
18 INJECTION, SOLUTION SUBCUTANEOUS DAILY
Qty: 3 | Refills: 3 | Status: DISCONTINUED | COMMUNITY
Start: 2023-11-08 | End: 2024-05-09

## 2024-05-09 NOTE — ASSESSMENT
[FreeTextEntry1] : Bariatric surgery history: none Obesity co - morbidities: depression, FISH, pcos  Comorbidities improved or resolved: none Anti-obesity medications: metformin, zepbound  Obesity medication side effects: none  Plan:   continue zepbound 15mg - will send 3 month supply   keep food journal  3 meals no snacks  continue regular exercise- walk daily     f/u 4 weeks   Breath sounds clear and equal bilaterally.

## 2024-05-09 NOTE — HISTORY OF PRESENT ILLNESS
[FreeTextEntry1] : This is a 38 year old female  being seen obesity followup visit.   She increased zepbound to 15mg, Had side effects for the first week- exhaustion/headache/queasy,  but then became tolerable  appetite well controlled  continues metformin 2000mg daily  Eating 3 meals a day no snacking  Has been walking 2-3 miles with double stroller 3 days a week  pool is open-active in the water  11k steps daily on average    Sleeping through the night- baby is sleeping through as per , snoring has gotten better  going to [place] next week

## 2024-05-13 DIAGNOSIS — E66.01 MORBID (SEVERE) OBESITY DUE TO EXCESS CALORIES: ICD-10-CM

## 2024-05-13 DIAGNOSIS — E28.2 POLYCYSTIC OVARIAN SYNDROME: ICD-10-CM

## 2024-05-13 DIAGNOSIS — G47.33 OBSTRUCTIVE SLEEP APNEA (ADULT) (PEDIATRIC): ICD-10-CM

## 2024-05-13 DIAGNOSIS — E16.1 OTHER HYPOGLYCEMIA: ICD-10-CM

## 2024-06-07 ENCOUNTER — APPOINTMENT (OUTPATIENT)
Dept: OTOLARYNGOLOGY | Facility: CLINIC | Age: 39
End: 2024-06-07
Payer: SELF-PAY

## 2024-06-07 PROCEDURE — 99024 POSTOP FOLLOW-UP VISIT: CPT

## 2024-06-12 NOTE — PHYSICAL EXAM
[de-identified] :  39 y/o patient presents to office for cosmetic consultation.  Pt would like to achieve a rejuvenated appearance and reduce facial wrinkles. Pt has had filler treatment in the past.   Procedure:  Patients name and date of birth confirmed. Pt skin was sterilized prepped and marked. Photo documentation and consent obtained.     Pt received .6cc to upper and lower lips   Treatment area:  see procedure note  Lot Number:  Expiration Date:    Pt tolerated procedure well with no complications.    Post- treatment:  Pt education includes no rubbing or touch injection sites. No tight fitting hats or headbands, No sweating or excessive exercise for 24 hours.  Pt can return to office for touch-up within 2 weeks and Pt will f/u in 3-4 months for cosmetic follow-up.

## 2024-06-17 ENCOUNTER — APPOINTMENT (OUTPATIENT)
Dept: BARIATRICS/WEIGHT MGMT | Facility: CLINIC | Age: 39
End: 2024-06-17
Payer: COMMERCIAL

## 2024-06-17 ENCOUNTER — OUTPATIENT (OUTPATIENT)
Dept: OUTPATIENT SERVICES | Facility: HOSPITAL | Age: 39
LOS: 1 days | End: 2024-06-17

## 2024-06-17 VITALS — HEIGHT: 65 IN | WEIGHT: 224 LBS | BODY MASS INDEX: 37.32 KG/M2

## 2024-06-17 DIAGNOSIS — I10 ESSENTIAL (PRIMARY) HYPERTENSION: ICD-10-CM

## 2024-06-17 DIAGNOSIS — Z98.890 OTHER SPECIFIED POSTPROCEDURAL STATES: Chronic | ICD-10-CM

## 2024-06-17 DIAGNOSIS — E16.1 OTHER HYPOGLYCEMIA: ICD-10-CM

## 2024-06-17 DIAGNOSIS — G47.33 OBSTRUCTIVE SLEEP APNEA (ADULT) (PEDIATRIC): ICD-10-CM

## 2024-06-17 DIAGNOSIS — Z87.39 PERSONAL HISTORY OF OTHER DISEASES OF THE MUSCULOSKELETAL SYSTEM AND CONNECTIVE TISSUE: Chronic | ICD-10-CM

## 2024-06-17 DIAGNOSIS — E66.01 MORBID (SEVERE) OBESITY DUE TO EXCESS CALORIES: ICD-10-CM

## 2024-06-17 DIAGNOSIS — Z98.891 HISTORY OF UTERINE SCAR FROM PREVIOUS SURGERY: Chronic | ICD-10-CM

## 2024-06-17 DIAGNOSIS — E28.2 POLYCYSTIC OVARIAN SYNDROME: ICD-10-CM

## 2024-06-17 PROCEDURE — 99213 OFFICE O/P EST LOW 20 MIN: CPT

## 2024-06-17 RX ORDER — TIRZEPATIDE 12.5 MG/.5ML
12.5 INJECTION, SOLUTION SUBCUTANEOUS
Qty: 4 | Refills: 2 | Status: DISCONTINUED | COMMUNITY
Start: 2024-04-11 | End: 2024-06-17

## 2024-06-17 NOTE — HISTORY OF PRESENT ILLNESS
[FreeTextEntry1] : This is a 38 year old female  being seen obesity followup visit.   She has continued zepbound 15mg and metformin 2000mg daily appetite well controlled- smaller portions  3 meals plus snacks ie: nuts, apricots    more active in summer, in the pool, outside with kids playing in backyard     PCP has recommended a company who has tirezipide compound, patient will most likely get when/if zepbound coupon expires

## 2024-06-17 NOTE — ASSESSMENT
[FreeTextEntry1] : Bariatric surgery history: none Obesity co - morbidities: depression, FISH, pcos  Comorbidities improved or resolved: none Anti-obesity medications: metformin, zepbound  Obesity medication side effects: none  Plan:   continue zepbound 15mg and metformin  would not be able to make any recommendations or advise if patient transitions to compound tirizipide , patient verbalizes understanding keep food journal  3 meals no snacks  continue regular exercise- walk daily     f/u 4 weeks

## 2024-07-29 ENCOUNTER — OUTPATIENT (OUTPATIENT)
Dept: OUTPATIENT SERVICES | Facility: HOSPITAL | Age: 39
LOS: 1 days | End: 2024-07-29
Payer: COMMERCIAL

## 2024-07-29 ENCOUNTER — APPOINTMENT (OUTPATIENT)
Dept: BARIATRICS/WEIGHT MGMT | Facility: CLINIC | Age: 39
End: 2024-07-29
Payer: COMMERCIAL

## 2024-07-29 VITALS — HEIGHT: 65 IN | BODY MASS INDEX: 35.99 KG/M2 | WEIGHT: 216 LBS

## 2024-07-29 DIAGNOSIS — G47.33 OBSTRUCTIVE SLEEP APNEA (ADULT) (PEDIATRIC): ICD-10-CM

## 2024-07-29 DIAGNOSIS — E66.01 MORBID (SEVERE) OBESITY DUE TO EXCESS CALORIES: ICD-10-CM

## 2024-07-29 DIAGNOSIS — Z98.890 OTHER SPECIFIED POSTPROCEDURAL STATES: Chronic | ICD-10-CM

## 2024-07-29 DIAGNOSIS — I10 ESSENTIAL (PRIMARY) HYPERTENSION: ICD-10-CM

## 2024-07-29 DIAGNOSIS — Z98.891 HISTORY OF UTERINE SCAR FROM PREVIOUS SURGERY: Chronic | ICD-10-CM

## 2024-07-29 DIAGNOSIS — E28.2 POLYCYSTIC OVARIAN SYNDROME: ICD-10-CM

## 2024-07-29 DIAGNOSIS — Z87.39 PERSONAL HISTORY OF OTHER DISEASES OF THE MUSCULOSKELETAL SYSTEM AND CONNECTIVE TISSUE: Chronic | ICD-10-CM

## 2024-07-29 DIAGNOSIS — E16.1 OTHER HYPOGLYCEMIA: ICD-10-CM

## 2024-07-29 PROCEDURE — G0463: CPT

## 2024-07-29 PROCEDURE — 99213 OFFICE O/P EST LOW 20 MIN: CPT | Mod: 95

## 2024-07-29 RX ORDER — METFORMIN ER 750 MG 750 MG/1
750 TABLET ORAL
Qty: 180 | Refills: 1 | Status: ACTIVE | COMMUNITY
Start: 2024-07-29 | End: 1900-01-01

## 2024-07-29 NOTE — HISTORY OF PRESENT ILLNESS
[FreeTextEntry1] : This is a 38 year old female  being seen obesity followup visit.   She has continued zepbound 15mg and metformin 2000mg daily appetite well controlled- smaller portions  3 meals plus snacks she increased her protein- beans and increased vegetables from garden  weight continues to trend down    active with her kids- swimming, walking and playing soccer   in sept 2 of the 3 children will be in school full time   Patient has been having  vasovagal events/fainting  Saw PCP  Sit to stand positive for orthostatic hypotension  associated with menstural cycle  drinks close to 1 gallon of water a day  ekg normal blood glucose test completed, all normal  referred to cardio

## 2024-07-29 NOTE — ASSESSMENT
[FreeTextEntry1] : Bariatric surgery history: none Obesity co - morbidities: depression, FISH, pcos  Comorbidities improved or resolved: none Anti-obesity medications: metformin, zepbound  Obesity medication side effects: none  Plan:   continue zepbound 15mg and metformin -will decrease dose to 1500mg ER po daily  keep food journal  3 meals no snacks  continue regular physical activity. will dedicated more regimented exercise when her children go to school   orthostatic hypotension- drink adequate amount of water, continue to increase fiber and plant based protein can add a touch of salt to food move slowly  f/u with ortho     f/u 4-6 weeks

## 2024-08-05 DIAGNOSIS — G47.33 OBSTRUCTIVE SLEEP APNEA (ADULT) (PEDIATRIC): ICD-10-CM

## 2024-08-05 DIAGNOSIS — E28.2 POLYCYSTIC OVARIAN SYNDROME: ICD-10-CM

## 2024-08-05 DIAGNOSIS — E16.1 OTHER HYPOGLYCEMIA: ICD-10-CM

## 2024-08-05 DIAGNOSIS — E66.01 MORBID (SEVERE) OBESITY DUE TO EXCESS CALORIES: ICD-10-CM

## 2024-09-09 ENCOUNTER — APPOINTMENT (OUTPATIENT)
Dept: BARIATRICS/WEIGHT MGMT | Facility: CLINIC | Age: 39
End: 2024-09-09
Payer: COMMERCIAL

## 2024-09-09 ENCOUNTER — OUTPATIENT (OUTPATIENT)
Dept: OUTPATIENT SERVICES | Facility: HOSPITAL | Age: 39
LOS: 1 days | End: 2024-09-09
Payer: COMMERCIAL

## 2024-09-09 VITALS — HEIGHT: 65 IN | BODY MASS INDEX: 34.49 KG/M2 | WEIGHT: 207 LBS

## 2024-09-09 DIAGNOSIS — Z98.890 OTHER SPECIFIED POSTPROCEDURAL STATES: Chronic | ICD-10-CM

## 2024-09-09 DIAGNOSIS — I10 ESSENTIAL (PRIMARY) HYPERTENSION: ICD-10-CM

## 2024-09-09 DIAGNOSIS — Z87.39 PERSONAL HISTORY OF OTHER DISEASES OF THE MUSCULOSKELETAL SYSTEM AND CONNECTIVE TISSUE: Chronic | ICD-10-CM

## 2024-09-09 DIAGNOSIS — Z98.891 HISTORY OF UTERINE SCAR FROM PREVIOUS SURGERY: Chronic | ICD-10-CM

## 2024-09-09 PROCEDURE — G0463: CPT

## 2024-09-09 PROCEDURE — 99213 OFFICE O/P EST LOW 20 MIN: CPT | Mod: 95

## 2024-09-09 PROCEDURE — G2211 COMPLEX E/M VISIT ADD ON: CPT | Mod: NC,95

## 2024-09-09 NOTE — HISTORY OF PRESENT ILLNESS
[FreeTextEntry1] : This is a 38 year old female  being seen obesity followup visit.   Weight continues to trend down  She has continued zepbound 15mg and decreased metformin to 1500g daily- denies any fainting episodes  appetite well controlled 3 meals, she has been trying to avoid snacking    she was sick last week with flu or PNA did not eat much   started walking, 1-2 miles every other day  kids returned to school last week     had cardio workup s/p vasovagal- WNL  PCP physical next week

## 2024-09-09 NOTE — ASSESSMENT
[FreeTextEntry1] : Bariatric surgery history: none Obesity co - morbidities: depression, FISH, pcos  Comorbidities improved or resolved: none Anti-obesity medications: metformin, zepbound  Obesity medication side effects: none  Plan:   continue zepbound 15mg and metformin -will decrease dose to 1500mg ER po daily  discussed maybe decreasing zepbound to 12.5mg as she has been losing more than 2 pounds a week  she would like to stay on metformin- feels less cravings for sweets on metformin  will determine medication change based on food journal keep food journal for 1 week and email   3 meals no snacks  continue regular physical activity. goal is daily physical activity    f/u 4-6 weeks

## 2024-09-16 DIAGNOSIS — E66.9 OBESITY, UNSPECIFIED: ICD-10-CM

## 2024-09-16 DIAGNOSIS — E16.1 OTHER HYPOGLYCEMIA: ICD-10-CM

## 2024-09-16 DIAGNOSIS — E28.2 POLYCYSTIC OVARIAN SYNDROME: ICD-10-CM

## 2024-10-07 ENCOUNTER — OUTPATIENT (OUTPATIENT)
Dept: OUTPATIENT SERVICES | Facility: HOSPITAL | Age: 39
LOS: 1 days | End: 2024-10-07
Payer: COMMERCIAL

## 2024-10-07 ENCOUNTER — APPOINTMENT (OUTPATIENT)
Dept: BARIATRICS/WEIGHT MGMT | Facility: CLINIC | Age: 39
End: 2024-10-07
Payer: COMMERCIAL

## 2024-10-07 DIAGNOSIS — I10 ESSENTIAL (PRIMARY) HYPERTENSION: ICD-10-CM

## 2024-10-07 DIAGNOSIS — Z87.39 PERSONAL HISTORY OF OTHER DISEASES OF THE MUSCULOSKELETAL SYSTEM AND CONNECTIVE TISSUE: Chronic | ICD-10-CM

## 2024-10-07 PROCEDURE — G2211 COMPLEX E/M VISIT ADD ON: CPT | Mod: 95

## 2024-10-07 PROCEDURE — G0463: CPT

## 2024-10-07 PROCEDURE — 99214 OFFICE O/P EST MOD 30 MIN: CPT | Mod: 95

## 2024-10-14 DIAGNOSIS — E28.2 POLYCYSTIC OVARIAN SYNDROME: ICD-10-CM

## 2024-10-14 DIAGNOSIS — E16.1 OTHER HYPOGLYCEMIA: ICD-10-CM

## 2024-10-14 DIAGNOSIS — E66.812 OBESITY, CLASS 2: ICD-10-CM

## 2024-11-08 ENCOUNTER — OUTPATIENT (OUTPATIENT)
Dept: OUTPATIENT SERVICES | Facility: HOSPITAL | Age: 39
LOS: 1 days | End: 2024-11-08
Payer: COMMERCIAL

## 2024-11-08 ENCOUNTER — APPOINTMENT (OUTPATIENT)
Dept: BARIATRICS/WEIGHT MGMT | Facility: CLINIC | Age: 39
End: 2024-11-08
Payer: COMMERCIAL

## 2024-11-08 VITALS — HEIGHT: 65 IN | WEIGHT: 199 LBS | BODY MASS INDEX: 33.15 KG/M2

## 2024-11-08 DIAGNOSIS — G47.33 OBSTRUCTIVE SLEEP APNEA (ADULT) (PEDIATRIC): ICD-10-CM

## 2024-11-08 DIAGNOSIS — E66.813 OBESITY, CLASS 3: ICD-10-CM

## 2024-11-08 DIAGNOSIS — E16.1 OTHER HYPOGLYCEMIA: ICD-10-CM

## 2024-11-08 DIAGNOSIS — Z87.39 PERSONAL HISTORY OF OTHER DISEASES OF THE MUSCULOSKELETAL SYSTEM AND CONNECTIVE TISSUE: Chronic | ICD-10-CM

## 2024-11-08 DIAGNOSIS — Z98.890 OTHER SPECIFIED POSTPROCEDURAL STATES: Chronic | ICD-10-CM

## 2024-11-08 DIAGNOSIS — I10 ESSENTIAL (PRIMARY) HYPERTENSION: ICD-10-CM

## 2024-11-08 DIAGNOSIS — Z98.891 HISTORY OF UTERINE SCAR FROM PREVIOUS SURGERY: Chronic | ICD-10-CM

## 2024-11-08 DIAGNOSIS — E28.2 POLYCYSTIC OVARIAN SYNDROME: ICD-10-CM

## 2024-11-08 PROCEDURE — G2211 COMPLEX E/M VISIT ADD ON: CPT | Mod: 95

## 2024-11-08 PROCEDURE — 99213 OFFICE O/P EST LOW 20 MIN: CPT | Mod: 95

## 2024-11-08 PROCEDURE — G0463: CPT

## 2024-11-11 DIAGNOSIS — E66.813 OBESITY, CLASS 3: ICD-10-CM

## 2024-11-11 DIAGNOSIS — G47.33 OBSTRUCTIVE SLEEP APNEA (ADULT) (PEDIATRIC): ICD-10-CM

## 2024-11-11 DIAGNOSIS — E16.1 OTHER HYPOGLYCEMIA: ICD-10-CM

## 2024-12-16 ENCOUNTER — APPOINTMENT (OUTPATIENT)
Dept: BARIATRICS/WEIGHT MGMT | Facility: CLINIC | Age: 39
End: 2024-12-16
Payer: COMMERCIAL

## 2024-12-16 ENCOUNTER — OUTPATIENT (OUTPATIENT)
Dept: OUTPATIENT SERVICES | Facility: HOSPITAL | Age: 39
LOS: 1 days | End: 2024-12-16
Payer: COMMERCIAL

## 2024-12-16 VITALS — HEIGHT: 65 IN | BODY MASS INDEX: 32.55 KG/M2 | WEIGHT: 195.38 LBS

## 2024-12-16 DIAGNOSIS — Z98.890 OTHER SPECIFIED POSTPROCEDURAL STATES: Chronic | ICD-10-CM

## 2024-12-16 DIAGNOSIS — I10 ESSENTIAL (PRIMARY) HYPERTENSION: ICD-10-CM

## 2024-12-16 DIAGNOSIS — Z98.891 HISTORY OF UTERINE SCAR FROM PREVIOUS SURGERY: Chronic | ICD-10-CM

## 2024-12-16 DIAGNOSIS — Z87.39 PERSONAL HISTORY OF OTHER DISEASES OF THE MUSCULOSKELETAL SYSTEM AND CONNECTIVE TISSUE: Chronic | ICD-10-CM

## 2024-12-16 PROCEDURE — 99213 OFFICE O/P EST LOW 20 MIN: CPT | Mod: 95

## 2024-12-16 PROCEDURE — G0463: CPT

## 2024-12-23 DIAGNOSIS — E66.813 OBESITY, CLASS 3: ICD-10-CM

## 2024-12-23 DIAGNOSIS — E16.1 OTHER HYPOGLYCEMIA: ICD-10-CM

## 2025-01-22 ENCOUNTER — NON-APPOINTMENT (OUTPATIENT)
Age: 40
End: 2025-01-22

## 2025-01-23 ENCOUNTER — OUTPATIENT (OUTPATIENT)
Dept: OUTPATIENT SERVICES | Facility: HOSPITAL | Age: 40
LOS: 1 days | End: 2025-01-23

## 2025-01-23 ENCOUNTER — APPOINTMENT (OUTPATIENT)
Dept: BARIATRICS/WEIGHT MGMT | Facility: CLINIC | Age: 40
End: 2025-01-23
Payer: COMMERCIAL

## 2025-01-23 VITALS — BODY MASS INDEX: 32.82 KG/M2 | HEIGHT: 65 IN | WEIGHT: 197 LBS

## 2025-01-23 DIAGNOSIS — Z98.890 OTHER SPECIFIED POSTPROCEDURAL STATES: Chronic | ICD-10-CM

## 2025-01-23 DIAGNOSIS — E28.2 POLYCYSTIC OVARIAN SYNDROME: ICD-10-CM

## 2025-01-23 DIAGNOSIS — I10 ESSENTIAL (PRIMARY) HYPERTENSION: ICD-10-CM

## 2025-01-23 DIAGNOSIS — E16.1 OTHER HYPOGLYCEMIA: ICD-10-CM

## 2025-01-23 DIAGNOSIS — G47.33 OBSTRUCTIVE SLEEP APNEA (ADULT) (PEDIATRIC): ICD-10-CM

## 2025-01-23 DIAGNOSIS — E66.813 OBESITY, CLASS 3: ICD-10-CM

## 2025-01-23 DIAGNOSIS — Z98.891 HISTORY OF UTERINE SCAR FROM PREVIOUS SURGERY: Chronic | ICD-10-CM

## 2025-01-23 DIAGNOSIS — Z87.39 PERSONAL HISTORY OF OTHER DISEASES OF THE MUSCULOSKELETAL SYSTEM AND CONNECTIVE TISSUE: Chronic | ICD-10-CM

## 2025-01-23 PROCEDURE — 99214 OFFICE O/P EST MOD 30 MIN: CPT | Mod: 95

## 2025-02-12 ENCOUNTER — APPOINTMENT (OUTPATIENT)
Dept: BARIATRICS/WEIGHT MGMT | Facility: CLINIC | Age: 40
End: 2025-02-12
Payer: COMMERCIAL

## 2025-02-12 ENCOUNTER — OUTPATIENT (OUTPATIENT)
Dept: OUTPATIENT SERVICES | Facility: HOSPITAL | Age: 40
LOS: 1 days | End: 2025-02-12

## 2025-02-12 VITALS — BODY MASS INDEX: 32.32 KG/M2 | HEIGHT: 65 IN | WEIGHT: 194 LBS

## 2025-02-12 DIAGNOSIS — E16.1 OTHER HYPOGLYCEMIA: ICD-10-CM

## 2025-02-12 DIAGNOSIS — Z98.890 OTHER SPECIFIED POSTPROCEDURAL STATES: Chronic | ICD-10-CM

## 2025-02-12 DIAGNOSIS — Z98.891 HISTORY OF UTERINE SCAR FROM PREVIOUS SURGERY: Chronic | ICD-10-CM

## 2025-02-12 DIAGNOSIS — I10 ESSENTIAL (PRIMARY) HYPERTENSION: ICD-10-CM

## 2025-02-12 DIAGNOSIS — Z87.39 PERSONAL HISTORY OF OTHER DISEASES OF THE MUSCULOSKELETAL SYSTEM AND CONNECTIVE TISSUE: Chronic | ICD-10-CM

## 2025-02-12 DIAGNOSIS — G47.33 OBSTRUCTIVE SLEEP APNEA (ADULT) (PEDIATRIC): ICD-10-CM

## 2025-02-12 DIAGNOSIS — E66.813 OBESITY, CLASS 3: ICD-10-CM

## 2025-02-12 DIAGNOSIS — E28.2 POLYCYSTIC OVARIAN SYNDROME: ICD-10-CM

## 2025-02-12 PROCEDURE — 99213 OFFICE O/P EST LOW 20 MIN: CPT | Mod: 95

## 2025-02-25 ENCOUNTER — APPOINTMENT (OUTPATIENT)
Age: 40
End: 2025-02-25
Payer: COMMERCIAL

## 2025-02-25 VITALS
DIASTOLIC BLOOD PRESSURE: 71 MMHG | HEIGHT: 65 IN | WEIGHT: 194 LBS | SYSTOLIC BLOOD PRESSURE: 105 MMHG | HEART RATE: 92 BPM | BODY MASS INDEX: 32.32 KG/M2

## 2025-02-25 DIAGNOSIS — M79.89 OTHER SPECIFIED SOFT TISSUE DISORDERS: ICD-10-CM

## 2025-02-25 DIAGNOSIS — I83.813 VARICOSE VEINS OF BILATERAL LOWER EXTREMITIES WITH PAIN: ICD-10-CM

## 2025-02-25 DIAGNOSIS — I87.1 COMPRESSION OF VEIN: ICD-10-CM

## 2025-02-25 PROCEDURE — 93978 VASCULAR STUDY: CPT

## 2025-02-25 PROCEDURE — 99204 OFFICE O/P NEW MOD 45 MIN: CPT

## 2025-03-18 ENCOUNTER — APPOINTMENT (OUTPATIENT)
Dept: BARIATRICS/WEIGHT MGMT | Facility: CLINIC | Age: 40
End: 2025-03-18
Payer: COMMERCIAL

## 2025-03-18 ENCOUNTER — OUTPATIENT (OUTPATIENT)
Dept: OUTPATIENT SERVICES | Facility: HOSPITAL | Age: 40
LOS: 1 days | End: 2025-03-18

## 2025-03-18 VITALS — BODY MASS INDEX: 32.32 KG/M2 | HEIGHT: 65 IN | WEIGHT: 194 LBS

## 2025-03-18 DIAGNOSIS — E28.2 POLYCYSTIC OVARIAN SYNDROME: ICD-10-CM

## 2025-03-18 DIAGNOSIS — E66.813 OBESITY, CLASS 3: ICD-10-CM

## 2025-03-18 DIAGNOSIS — Z98.891 HISTORY OF UTERINE SCAR FROM PREVIOUS SURGERY: Chronic | ICD-10-CM

## 2025-03-18 DIAGNOSIS — I10 ESSENTIAL (PRIMARY) HYPERTENSION: ICD-10-CM

## 2025-03-18 DIAGNOSIS — Z98.890 OTHER SPECIFIED POSTPROCEDURAL STATES: Chronic | ICD-10-CM

## 2025-03-18 DIAGNOSIS — Z87.39 PERSONAL HISTORY OF OTHER DISEASES OF THE MUSCULOSKELETAL SYSTEM AND CONNECTIVE TISSUE: Chronic | ICD-10-CM

## 2025-03-18 DIAGNOSIS — G47.33 OBSTRUCTIVE SLEEP APNEA (ADULT) (PEDIATRIC): ICD-10-CM

## 2025-03-18 PROCEDURE — 99214 OFFICE O/P EST MOD 30 MIN: CPT | Mod: 95

## 2025-03-24 DIAGNOSIS — E66.813 OBESITY, CLASS 3: ICD-10-CM

## 2025-03-24 DIAGNOSIS — G47.33 OBSTRUCTIVE SLEEP APNEA (ADULT) (PEDIATRIC): ICD-10-CM

## 2025-03-24 DIAGNOSIS — E28.2 POLYCYSTIC OVARIAN SYNDROME: ICD-10-CM

## 2025-03-25 ENCOUNTER — APPOINTMENT (OUTPATIENT)
Dept: CT IMAGING | Facility: CLINIC | Age: 40
End: 2025-03-25
Payer: COMMERCIAL

## 2025-03-25 ENCOUNTER — OUTPATIENT (OUTPATIENT)
Dept: OUTPATIENT SERVICES | Facility: HOSPITAL | Age: 40
LOS: 1 days | End: 2025-03-25
Payer: COMMERCIAL

## 2025-03-25 DIAGNOSIS — Z98.890 OTHER SPECIFIED POSTPROCEDURAL STATES: Chronic | ICD-10-CM

## 2025-03-25 DIAGNOSIS — Z00.8 ENCOUNTER FOR OTHER GENERAL EXAMINATION: ICD-10-CM

## 2025-03-25 DIAGNOSIS — Z87.39 PERSONAL HISTORY OF OTHER DISEASES OF THE MUSCULOSKELETAL SYSTEM AND CONNECTIVE TISSUE: Chronic | ICD-10-CM

## 2025-03-25 DIAGNOSIS — Z98.891 HISTORY OF UTERINE SCAR FROM PREVIOUS SURGERY: Chronic | ICD-10-CM

## 2025-03-25 PROCEDURE — 74174 CTA ABD&PLVS W/CONTRAST: CPT

## 2025-03-25 PROCEDURE — 74174 CTA ABD&PLVS W/CONTRAST: CPT | Mod: 26

## 2025-04-23 ENCOUNTER — APPOINTMENT (OUTPATIENT)
Dept: BARIATRICS/WEIGHT MGMT | Facility: CLINIC | Age: 40
End: 2025-04-23
Payer: COMMERCIAL

## 2025-04-23 ENCOUNTER — OUTPATIENT (OUTPATIENT)
Dept: OUTPATIENT SERVICES | Facility: HOSPITAL | Age: 40
LOS: 1 days | End: 2025-04-23

## 2025-04-23 DIAGNOSIS — Z98.890 OTHER SPECIFIED POSTPROCEDURAL STATES: Chronic | ICD-10-CM

## 2025-04-23 DIAGNOSIS — Z98.891 HISTORY OF UTERINE SCAR FROM PREVIOUS SURGERY: Chronic | ICD-10-CM

## 2025-04-23 DIAGNOSIS — G47.33 OBSTRUCTIVE SLEEP APNEA (ADULT) (PEDIATRIC): ICD-10-CM

## 2025-04-23 DIAGNOSIS — E28.2 POLYCYSTIC OVARIAN SYNDROME: ICD-10-CM

## 2025-04-23 DIAGNOSIS — I10 ESSENTIAL (PRIMARY) HYPERTENSION: ICD-10-CM

## 2025-04-23 DIAGNOSIS — E16.1 OTHER HYPOGLYCEMIA: ICD-10-CM

## 2025-04-23 DIAGNOSIS — E66.813 OBESITY, CLASS 3: ICD-10-CM

## 2025-04-23 DIAGNOSIS — Z87.39 PERSONAL HISTORY OF OTHER DISEASES OF THE MUSCULOSKELETAL SYSTEM AND CONNECTIVE TISSUE: Chronic | ICD-10-CM

## 2025-04-23 PROCEDURE — 99213 OFFICE O/P EST LOW 20 MIN: CPT | Mod: 95

## 2025-06-04 ENCOUNTER — OUTPATIENT (OUTPATIENT)
Dept: OUTPATIENT SERVICES | Facility: HOSPITAL | Age: 40
LOS: 1 days | End: 2025-06-04

## 2025-06-04 ENCOUNTER — APPOINTMENT (OUTPATIENT)
Dept: BARIATRICS/WEIGHT MGMT | Facility: CLINIC | Age: 40
End: 2025-06-04
Payer: COMMERCIAL

## 2025-06-04 VITALS — WEIGHT: 185 LBS | HEIGHT: 65 IN | BODY MASS INDEX: 30.82 KG/M2

## 2025-06-04 DIAGNOSIS — Z98.890 OTHER SPECIFIED POSTPROCEDURAL STATES: Chronic | ICD-10-CM

## 2025-06-04 DIAGNOSIS — I10 ESSENTIAL (PRIMARY) HYPERTENSION: ICD-10-CM

## 2025-06-04 DIAGNOSIS — E66.813 OBESITY, CLASS 3: ICD-10-CM

## 2025-06-04 DIAGNOSIS — E16.1 OTHER HYPOGLYCEMIA: ICD-10-CM

## 2025-06-04 DIAGNOSIS — G47.33 OBSTRUCTIVE SLEEP APNEA (ADULT) (PEDIATRIC): ICD-10-CM

## 2025-06-04 DIAGNOSIS — Z98.891 HISTORY OF UTERINE SCAR FROM PREVIOUS SURGERY: Chronic | ICD-10-CM

## 2025-06-04 DIAGNOSIS — Z87.39 PERSONAL HISTORY OF OTHER DISEASES OF THE MUSCULOSKELETAL SYSTEM AND CONNECTIVE TISSUE: Chronic | ICD-10-CM

## 2025-06-04 DIAGNOSIS — E28.2 POLYCYSTIC OVARIAN SYNDROME: ICD-10-CM

## 2025-06-04 PROCEDURE — 99213 OFFICE O/P EST LOW 20 MIN: CPT | Mod: 95

## 2025-06-09 DIAGNOSIS — E66.813 OBESITY, CLASS 3: ICD-10-CM

## 2025-06-09 DIAGNOSIS — G47.33 OBSTRUCTIVE SLEEP APNEA (ADULT) (PEDIATRIC): ICD-10-CM

## 2025-06-09 DIAGNOSIS — E16.1 OTHER HYPOGLYCEMIA: ICD-10-CM

## 2025-06-10 ENCOUNTER — APPOINTMENT (OUTPATIENT)
Age: 40
End: 2025-06-10
Payer: COMMERCIAL

## 2025-06-10 VITALS
BODY MASS INDEX: 30.82 KG/M2 | WEIGHT: 185 LBS | TEMPERATURE: 97.8 F | HEART RATE: 71 BPM | OXYGEN SATURATION: 98 % | DIASTOLIC BLOOD PRESSURE: 68 MMHG | HEIGHT: 65 IN | SYSTOLIC BLOOD PRESSURE: 99 MMHG

## 2025-06-10 PROCEDURE — 99213 OFFICE O/P EST LOW 20 MIN: CPT

## 2025-06-10 PROCEDURE — 93979 VASCULAR STUDY: CPT

## 2025-06-30 ENCOUNTER — APPOINTMENT (OUTPATIENT)
Dept: OBGYN | Facility: CLINIC | Age: 40
End: 2025-06-30
Payer: COMMERCIAL

## 2025-06-30 PROCEDURE — 99459 PELVIC EXAMINATION: CPT | Mod: NC

## 2025-06-30 PROCEDURE — 99213 OFFICE O/P EST LOW 20 MIN: CPT

## 2025-07-15 ENCOUNTER — APPOINTMENT (OUTPATIENT)
Dept: BARIATRICS/WEIGHT MGMT | Facility: CLINIC | Age: 40
End: 2025-07-15
Payer: COMMERCIAL

## 2025-07-15 ENCOUNTER — OUTPATIENT (OUTPATIENT)
Dept: OUTPATIENT SERVICES | Facility: HOSPITAL | Age: 40
LOS: 1 days | End: 2025-07-15

## 2025-07-15 VITALS — HEIGHT: 65 IN | WEIGHT: 181.13 LBS | BODY MASS INDEX: 30.18 KG/M2

## 2025-07-15 DIAGNOSIS — Z87.39 PERSONAL HISTORY OF OTHER DISEASES OF THE MUSCULOSKELETAL SYSTEM AND CONNECTIVE TISSUE: Chronic | ICD-10-CM

## 2025-07-15 DIAGNOSIS — I10 ESSENTIAL (PRIMARY) HYPERTENSION: ICD-10-CM

## 2025-07-15 DIAGNOSIS — Z98.891 HISTORY OF UTERINE SCAR FROM PREVIOUS SURGERY: Chronic | ICD-10-CM

## 2025-07-15 DIAGNOSIS — Z98.890 OTHER SPECIFIED POSTPROCEDURAL STATES: Chronic | ICD-10-CM

## 2025-07-15 PROCEDURE — 99213 OFFICE O/P EST LOW 20 MIN: CPT | Mod: 95

## 2025-07-21 DIAGNOSIS — E66.813 OBESITY, CLASS 3: ICD-10-CM

## 2025-07-21 DIAGNOSIS — G47.33 OBSTRUCTIVE SLEEP APNEA (ADULT) (PEDIATRIC): ICD-10-CM

## 2025-09-03 ENCOUNTER — OUTPATIENT (OUTPATIENT)
Dept: OUTPATIENT SERVICES | Facility: HOSPITAL | Age: 40
LOS: 1 days | End: 2025-09-03

## 2025-09-03 ENCOUNTER — APPOINTMENT (OUTPATIENT)
Dept: BARIATRICS/WEIGHT MGMT | Facility: CLINIC | Age: 40
End: 2025-09-03
Payer: COMMERCIAL

## 2025-09-03 DIAGNOSIS — I10 ESSENTIAL (PRIMARY) HYPERTENSION: ICD-10-CM

## 2025-09-03 DIAGNOSIS — Z87.39 PERSONAL HISTORY OF OTHER DISEASES OF THE MUSCULOSKELETAL SYSTEM AND CONNECTIVE TISSUE: Chronic | ICD-10-CM

## 2025-09-03 DIAGNOSIS — E28.2 POLYCYSTIC OVARIAN SYNDROME: ICD-10-CM

## 2025-09-03 DIAGNOSIS — Z98.891 HISTORY OF UTERINE SCAR FROM PREVIOUS SURGERY: Chronic | ICD-10-CM

## 2025-09-03 DIAGNOSIS — Z98.890 OTHER SPECIFIED POSTPROCEDURAL STATES: Chronic | ICD-10-CM

## 2025-09-03 DIAGNOSIS — G47.33 OBSTRUCTIVE SLEEP APNEA (ADULT) (PEDIATRIC): ICD-10-CM

## 2025-09-03 DIAGNOSIS — E66.813 OBESITY, CLASS 3: ICD-10-CM

## 2025-09-03 DIAGNOSIS — E16.1 OTHER HYPOGLYCEMIA: ICD-10-CM

## 2025-09-03 PROCEDURE — 99212 OFFICE O/P EST SF 10 MIN: CPT | Mod: 95

## (undated) DEVICE — GLV 8 PROTEXIS (CREAM) NEU-THERA

## (undated) DEVICE — ADAPTER CHECK FLO 9FR STERILE

## (undated) DEVICE — SOL IRR BAG NS 0.9% 3000ML

## (undated) DEVICE — PACK CYSTO

## (undated) DEVICE — WARMING BLANKET UPPER ADULT

## (undated) DEVICE — DRAPE DRAINAGE BAG RELAX & GEMINI

## (undated) DEVICE — VENODYNE/SCD SLEEVE CALF MEDIUM

## (undated) DEVICE — SOL IRR BAG H2O 3000ML

## (undated) DEVICE — SPECIMEN CONTAINER 100ML

## (undated) DEVICE — Device

## (undated) DEVICE — POSITIONER FOAM HEADREST (PINK)

## (undated) DEVICE — SOL IRR POUR H2O 1500ML

## (undated) DEVICE — DRAPE LINGEMAN TUR

## (undated) DEVICE — TUBING SUCTION 20FT

## (undated) DEVICE — SYR LUER LOK 10CC

## (undated) DEVICE — POSITIONER FOAM EGG CRATE ULNAR 2PCS (PINK)

## (undated) DEVICE — GOWN TRIMAX LG

## (undated) DEVICE — PREP BETADINE KIT

## (undated) DEVICE — BAG URINE W METER 2L

## (undated) DEVICE — ACMI SELF-SEALING SEAL UP TO 7FR

## (undated) DEVICE — DRAPE EQUIPMENT BANDED BAG 30 X 30" (SHOWER CAP)

## (undated) DEVICE — TUBING THERMADX UROLOGY